# Patient Record
Sex: FEMALE | Race: WHITE | Employment: OTHER | URBAN - METROPOLITAN AREA
[De-identification: names, ages, dates, MRNs, and addresses within clinical notes are randomized per-mention and may not be internally consistent; named-entity substitution may affect disease eponyms.]

---

## 2017-04-20 PROCEDURE — 82570 ASSAY OF URINE CREATININE: CPT | Performed by: INTERNAL MEDICINE

## 2017-04-20 PROCEDURE — 81003 URINALYSIS AUTO W/O SCOPE: CPT | Performed by: INTERNAL MEDICINE

## 2017-04-20 PROCEDURE — 82043 UR ALBUMIN QUANTITATIVE: CPT | Performed by: INTERNAL MEDICINE

## 2017-05-15 PROBLEM — G45.9 TRANSIENT CEREBRAL ISCHEMIA, UNSPECIFIED TYPE: Status: ACTIVE | Noted: 2017-05-15

## 2017-07-06 PROCEDURE — 87086 URINE CULTURE/COLONY COUNT: CPT | Performed by: INTERNAL MEDICINE

## 2018-05-16 PROCEDURE — 87086 URINE CULTURE/COLONY COUNT: CPT | Performed by: INTERNAL MEDICINE

## 2018-05-16 PROCEDURE — 82570 ASSAY OF URINE CREATININE: CPT | Performed by: INTERNAL MEDICINE

## 2018-05-16 PROCEDURE — 87186 SC STD MICRODIL/AGAR DIL: CPT | Performed by: INTERNAL MEDICINE

## 2018-05-16 PROCEDURE — 87088 URINE BACTERIA CULTURE: CPT | Performed by: INTERNAL MEDICINE

## 2018-05-16 PROCEDURE — 82043 UR ALBUMIN QUANTITATIVE: CPT | Performed by: INTERNAL MEDICINE

## 2019-05-14 PROCEDURE — 86256 FLUORESCENT ANTIBODY TITER: CPT | Performed by: INTERNAL MEDICINE

## 2019-05-14 PROCEDURE — 86803 HEPATITIS C AB TEST: CPT | Performed by: INTERNAL MEDICINE

## 2019-05-14 PROCEDURE — 82784 ASSAY IGA/IGD/IGG/IGM EACH: CPT | Performed by: INTERNAL MEDICINE

## 2019-09-04 ENCOUNTER — OFFICE VISIT (OUTPATIENT)
Dept: HEMATOLOGY/ONCOLOGY | Facility: HOSPITAL | Age: 74
End: 2019-09-04
Attending: THORACIC SURGERY (CARDIOTHORACIC VASCULAR SURGERY)
Payer: COMMERCIAL

## 2019-09-04 VITALS
TEMPERATURE: 97 F | WEIGHT: 175 LBS | DIASTOLIC BLOOD PRESSURE: 79 MMHG | HEIGHT: 66 IN | HEART RATE: 79 BPM | BODY MASS INDEX: 28.13 KG/M2 | SYSTOLIC BLOOD PRESSURE: 132 MMHG | RESPIRATION RATE: 18 BRPM | OXYGEN SATURATION: 97 %

## 2019-09-04 DIAGNOSIS — R91.8 LUNG NODULES: Primary | ICD-10-CM

## 2019-09-04 PROCEDURE — 99211 OFF/OP EST MAY X REQ PHY/QHP: CPT

## 2019-09-04 NOTE — H&P (VIEW-ONLY)
Thoracic Surgery Consult    Reason for Consultation: Abnormal CT    Consulting Physician: Deepak Wesley    Chief Complaint: \"lung nodule got bigger\"    History of Present Illness: Patient is a 72year old, female with PMHx HTN, DM, L breast cancer s/p lumpectomy 8/31/2010   • Obesity    • Ovarian cyst    • Plantar fascial fibromatosis    • TIA (transient ischemic attack) 2016    right sided numbness   • Vitamin D deficiency 04/12/2010       Past Surgical History:    Past Surgical History:   Procedure Laterality Da Brother          Review of Systems:    A 10 point review of systems was conducted and was negative except that which was  listed in the above HPI as well as:  Pertinent negatives include:    - No unusual headaches, weakness or numbness  - No chest pain  - groundglass density within the peripheral aspect of the right upper lobe, the latter displaying mildly spiculated peripheral margins as well as a small, < 5 mm, solid nodular component, both displaying significant interval volume increase since 2/2/2018. on frozen section. Her pulmonary function tests suggest that she has adequate lung function to undergo the proposed surgery.   I went over the alternatives (surveillance, needle biopsy with possible radiation, chemotherapy) and the risks, including: bleedi

## 2019-09-04 NOTE — CONSULTS
Thoracic Surgery Consult    Reason for Consultation: Abnormal CT    Consulting Physician: Saintclair Raker    Chief Complaint: \"lung nodule got bigger\"    History of Present Illness: Patient is a 72year old, female with PMHx HTN, DM, L breast cancer s/p lumpectomy 8/31/2010   • Obesity    • Ovarian cyst    • Plantar fascial fibromatosis    • TIA (transient ischemic attack) 2016    right sided numbness   • Vitamin D deficiency 04/12/2010       Past Surgical History:    Past Surgical History:   Procedure Laterality Da Brother          Review of Systems:    A 10 point review of systems was conducted and was negative except that which was  listed in the above HPI as well as:  Pertinent negatives include:    - No unusual headaches, weakness or numbness  - No chest pain  - groundglass density within the peripheral aspect of the right upper lobe, the latter displaying mildly spiculated peripheral margins as well as a small, < 5 mm, solid nodular component, both displaying significant interval volume increase since 2/2/2018. on frozen section. Her pulmonary function tests suggest that she has adequate lung function to undergo the proposed surgery.   I went over the alternatives (surveillance, needle biopsy with possible radiation, chemotherapy) and the risks, including: bleedi

## 2019-10-02 ENCOUNTER — ANESTHESIA (OUTPATIENT)
Dept: CARDIAC SURGERY | Facility: HOSPITAL | Age: 74
End: 2019-10-02

## 2019-10-02 ENCOUNTER — ANESTHESIA EVENT (OUTPATIENT)
Dept: CARDIAC SURGERY | Facility: HOSPITAL | Age: 74
End: 2019-10-02

## 2019-10-02 ENCOUNTER — HOSPITAL ENCOUNTER (INPATIENT)
Facility: HOSPITAL | Age: 74
LOS: 3 days | Discharge: HOME | DRG: 164 | End: 2019-10-05
Attending: THORACIC SURGERY (CARDIOTHORACIC VASCULAR SURGERY) | Admitting: THORACIC SURGERY (CARDIOTHORACIC VASCULAR SURGERY)
Payer: COMMERCIAL

## 2019-10-02 PROCEDURE — 87116 MYCOBACTERIA CULTURE: CPT | Performed by: THORACIC SURGERY (CARDIOTHORACIC VASCULAR SURGERY)

## 2019-10-02 PROCEDURE — 88321 CONSLTJ&REPRT SLD PREP ELSWR: CPT | Performed by: THORACIC SURGERY (CARDIOTHORACIC VASCULAR SURGERY)

## 2019-10-02 PROCEDURE — 88305 TISSUE EXAM BY PATHOLOGIST: CPT | Performed by: THORACIC SURGERY (CARDIOTHORACIC VASCULAR SURGERY)

## 2019-10-02 PROCEDURE — 88341 IMHCHEM/IMCYTCHM EA ADD ANTB: CPT | Performed by: THORACIC SURGERY (CARDIOTHORACIC VASCULAR SURGERY)

## 2019-10-02 PROCEDURE — 88313 SPECIAL STAINS GROUP 2: CPT | Performed by: THORACIC SURGERY (CARDIOTHORACIC VASCULAR SURGERY)

## 2019-10-02 PROCEDURE — 82962 GLUCOSE BLOOD TEST: CPT

## 2019-10-02 PROCEDURE — 88331 PATH CONSLTJ SURG 1 BLK 1SPC: CPT | Performed by: THORACIC SURGERY (CARDIOTHORACIC VASCULAR SURGERY)

## 2019-10-02 PROCEDURE — 87102 FUNGUS ISOLATION CULTURE: CPT | Performed by: THORACIC SURGERY (CARDIOTHORACIC VASCULAR SURGERY)

## 2019-10-02 PROCEDURE — 3E0T3BZ INTRODUCTION OF ANESTHETIC AGENT INTO PERIPHERAL NERVES AND PLEXI, PERCUTANEOUS APPROACH: ICD-10-PCS | Performed by: THORACIC SURGERY (CARDIOTHORACIC VASCULAR SURGERY)

## 2019-10-02 PROCEDURE — 86901 BLOOD TYPING SEROLOGIC RH(D): CPT | Performed by: THORACIC SURGERY (CARDIOTHORACIC VASCULAR SURGERY)

## 2019-10-02 PROCEDURE — 87206 SMEAR FLUORESCENT/ACID STAI: CPT | Performed by: THORACIC SURGERY (CARDIOTHORACIC VASCULAR SURGERY)

## 2019-10-02 PROCEDURE — 88312 SPECIAL STAINS GROUP 1: CPT | Performed by: THORACIC SURGERY (CARDIOTHORACIC VASCULAR SURGERY)

## 2019-10-02 PROCEDURE — 93010 ELECTROCARDIOGRAM REPORT: CPT | Performed by: INTERNAL MEDICINE

## 2019-10-02 PROCEDURE — 07B74ZX EXCISION OF THORAX LYMPHATIC, PERCUTANEOUS ENDOSCOPIC APPROACH, DIAGNOSTIC: ICD-10-PCS | Performed by: THORACIC SURGERY (CARDIOTHORACIC VASCULAR SURGERY)

## 2019-10-02 PROCEDURE — 86850 RBC ANTIBODY SCREEN: CPT | Performed by: THORACIC SURGERY (CARDIOTHORACIC VASCULAR SURGERY)

## 2019-10-02 PROCEDURE — 0BTC4ZZ RESECTION OF RIGHT UPPER LUNG LOBE, PERCUTANEOUS ENDOSCOPIC APPROACH: ICD-10-PCS | Performed by: THORACIC SURGERY (CARDIOTHORACIC VASCULAR SURGERY)

## 2019-10-02 PROCEDURE — 0BBF4ZX EXCISION OF RIGHT LOWER LUNG LOBE, PERCUTANEOUS ENDOSCOPIC APPROACH, DIAGNOSTIC: ICD-10-PCS | Performed by: THORACIC SURGERY (CARDIOTHORACIC VASCULAR SURGERY)

## 2019-10-02 PROCEDURE — 88309 TISSUE EXAM BY PATHOLOGIST: CPT | Performed by: THORACIC SURGERY (CARDIOTHORACIC VASCULAR SURGERY)

## 2019-10-02 PROCEDURE — 88342 IMHCHEM/IMCYTCHM 1ST ANTB: CPT | Performed by: THORACIC SURGERY (CARDIOTHORACIC VASCULAR SURGERY)

## 2019-10-02 PROCEDURE — 93005 ELECTROCARDIOGRAM TRACING: CPT

## 2019-10-02 PROCEDURE — 0BJ08ZZ INSPECTION OF TRACHEOBRONCHIAL TREE, VIA NATURAL OR ARTIFICIAL OPENING ENDOSCOPIC: ICD-10-PCS | Performed by: THORACIC SURGERY (CARDIOTHORACIC VASCULAR SURGERY)

## 2019-10-02 PROCEDURE — 86900 BLOOD TYPING SEROLOGIC ABO: CPT | Performed by: THORACIC SURGERY (CARDIOTHORACIC VASCULAR SURGERY)

## 2019-10-02 PROCEDURE — 87081 CULTURE SCREEN ONLY: CPT | Performed by: HOSPITALIST

## 2019-10-02 PROCEDURE — 86920 COMPATIBILITY TEST SPIN: CPT

## 2019-10-02 PROCEDURE — 88332 PATH CONSLTJ SURG EA ADD BLK: CPT | Performed by: THORACIC SURGERY (CARDIOTHORACIC VASCULAR SURGERY)

## 2019-10-02 RX ORDER — MORPHINE SULFATE 4 MG/ML
6 INJECTION, SOLUTION INTRAMUSCULAR; INTRAVENOUS EVERY 4 HOURS PRN
Status: DISCONTINUED | OUTPATIENT
Start: 2019-10-02 | End: 2019-10-05

## 2019-10-02 RX ORDER — HYDROCODONE BITARTRATE AND ACETAMINOPHEN 5; 325 MG/1; MG/1
1 TABLET ORAL AS NEEDED
Status: DISCONTINUED | OUTPATIENT
Start: 2019-10-02 | End: 2019-10-02

## 2019-10-02 RX ORDER — SODIUM CHLORIDE, SODIUM LACTATE, POTASSIUM CHLORIDE, CALCIUM CHLORIDE 600; 310; 30; 20 MG/100ML; MG/100ML; MG/100ML; MG/100ML
INJECTION, SOLUTION INTRAVENOUS CONTINUOUS
Status: DISCONTINUED | OUTPATIENT
Start: 2019-10-02 | End: 2019-10-02 | Stop reason: HOSPADM

## 2019-10-02 RX ORDER — HYDROMORPHONE HYDROCHLORIDE 1 MG/ML
INJECTION, SOLUTION INTRAMUSCULAR; INTRAVENOUS; SUBCUTANEOUS
Status: COMPLETED
Start: 2019-10-02 | End: 2019-10-02

## 2019-10-02 RX ORDER — HYDROMORPHONE HYDROCHLORIDE 1 MG/ML
0.4 INJECTION, SOLUTION INTRAMUSCULAR; INTRAVENOUS; SUBCUTANEOUS EVERY 5 MIN PRN
Status: DISCONTINUED | OUTPATIENT
Start: 2019-10-02 | End: 2019-10-02 | Stop reason: HOSPADM

## 2019-10-02 RX ORDER — OXYCODONE HYDROCHLORIDE 5 MG/1
5 TABLET ORAL EVERY 4 HOURS PRN
Status: DISCONTINUED | OUTPATIENT
Start: 2019-10-02 | End: 2019-10-05

## 2019-10-02 RX ORDER — MORPHINE SULFATE 4 MG/ML
2 INJECTION, SOLUTION INTRAMUSCULAR; INTRAVENOUS EVERY 4 HOURS PRN
Status: DISCONTINUED | OUTPATIENT
Start: 2019-10-02 | End: 2019-10-05

## 2019-10-02 RX ORDER — POLYETHYLENE GLYCOL 3350 17 G/17G
17 POWDER, FOR SOLUTION ORAL DAILY PRN
Status: DISCONTINUED | OUTPATIENT
Start: 2019-10-02 | End: 2019-10-05

## 2019-10-02 RX ORDER — DEXTROSE MONOHYDRATE 25 G/50ML
50 INJECTION, SOLUTION INTRAVENOUS
Status: DISCONTINUED | OUTPATIENT
Start: 2019-10-02 | End: 2019-10-05

## 2019-10-02 RX ORDER — HEPARIN SODIUM 5000 [USP'U]/ML
5000 INJECTION, SOLUTION INTRAVENOUS; SUBCUTANEOUS ONCE
Status: DISCONTINUED | OUTPATIENT
Start: 2019-10-02 | End: 2019-10-02 | Stop reason: ALTCHOICE

## 2019-10-02 RX ORDER — NALOXONE HYDROCHLORIDE 0.4 MG/ML
80 INJECTION, SOLUTION INTRAMUSCULAR; INTRAVENOUS; SUBCUTANEOUS AS NEEDED
Status: DISCONTINUED | OUTPATIENT
Start: 2019-10-02 | End: 2019-10-02 | Stop reason: HOSPADM

## 2019-10-02 RX ORDER — ATORVASTATIN CALCIUM 10 MG/1
10 TABLET, FILM COATED ORAL NIGHTLY
Status: DISCONTINUED | OUTPATIENT
Start: 2019-10-02 | End: 2019-10-05

## 2019-10-02 RX ORDER — ASPIRIN 81 MG/1
81 TABLET ORAL DAILY
Status: DISCONTINUED | OUTPATIENT
Start: 2019-10-04 | End: 2019-10-05

## 2019-10-02 RX ORDER — SODIUM CHLORIDE 9 MG/ML
INJECTION, SOLUTION INTRAVENOUS CONTINUOUS
Status: DISCONTINUED | OUTPATIENT
Start: 2019-10-02 | End: 2019-10-05

## 2019-10-02 RX ORDER — ACETAMINOPHEN 10 MG/ML
1000 INJECTION, SOLUTION INTRAVENOUS EVERY 6 HOURS
Status: COMPLETED | OUTPATIENT
Start: 2019-10-02 | End: 2019-10-04

## 2019-10-02 RX ORDER — BISACODYL 10 MG
10 SUPPOSITORY, RECTAL RECTAL
Status: DISCONTINUED | OUTPATIENT
Start: 2019-10-02 | End: 2019-10-05

## 2019-10-02 RX ORDER — SODIUM CHLORIDE 9 MG/ML
INJECTION, SOLUTION INTRAVENOUS ONCE
Status: DISCONTINUED | OUTPATIENT
Start: 2019-10-02 | End: 2019-10-02

## 2019-10-02 RX ORDER — DEXTROSE MONOHYDRATE 25 G/50ML
50 INJECTION, SOLUTION INTRAVENOUS
Status: DISCONTINUED | OUTPATIENT
Start: 2019-10-02 | End: 2019-10-02 | Stop reason: HOSPADM

## 2019-10-02 RX ORDER — HYDROCODONE BITARTRATE AND ACETAMINOPHEN 5; 325 MG/1; MG/1
2 TABLET ORAL AS NEEDED
Status: DISCONTINUED | OUTPATIENT
Start: 2019-10-02 | End: 2019-10-02

## 2019-10-02 RX ORDER — MORPHINE SULFATE 4 MG/ML
4 INJECTION, SOLUTION INTRAMUSCULAR; INTRAVENOUS EVERY 4 HOURS PRN
Status: DISCONTINUED | OUTPATIENT
Start: 2019-10-02 | End: 2019-10-05

## 2019-10-02 RX ORDER — OXYCODONE HYDROCHLORIDE 5 MG/1
10 TABLET ORAL EVERY 4 HOURS PRN
Status: DISCONTINUED | OUTPATIENT
Start: 2019-10-02 | End: 2019-10-05

## 2019-10-02 RX ORDER — ONDANSETRON 2 MG/ML
4 INJECTION INTRAMUSCULAR; INTRAVENOUS AS NEEDED
Status: DISCONTINUED | OUTPATIENT
Start: 2019-10-02 | End: 2019-10-02 | Stop reason: HOSPADM

## 2019-10-02 RX ORDER — SODIUM CHLORIDE 0.9 % (FLUSH) 0.9 %
10 SYRINGE (ML) INJECTION AS NEEDED
Status: DISCONTINUED | OUTPATIENT
Start: 2019-10-02 | End: 2019-10-05

## 2019-10-02 RX ORDER — INSULIN ASPART 100 [IU]/ML
2 INJECTION, SOLUTION INTRAVENOUS; SUBCUTANEOUS ONCE
Status: COMPLETED | OUTPATIENT
Start: 2019-10-02 | End: 2019-10-02

## 2019-10-02 RX ORDER — INSULIN ASPART 100 [IU]/ML
INJECTION, SOLUTION INTRAVENOUS; SUBCUTANEOUS
Status: COMPLETED
Start: 2019-10-02 | End: 2019-10-02

## 2019-10-02 RX ORDER — LISINOPRIL 5 MG/1
5 TABLET ORAL DAILY
Status: DISCONTINUED | OUTPATIENT
Start: 2019-10-03 | End: 2019-10-05

## 2019-10-02 RX ORDER — CEFAZOLIN SODIUM/WATER 2 G/20 ML
2 SYRINGE (ML) INTRAVENOUS EVERY 8 HOURS
Status: COMPLETED | OUTPATIENT
Start: 2019-10-02 | End: 2019-10-02

## 2019-10-02 RX ORDER — ONDANSETRON 2 MG/ML
4 INJECTION INTRAMUSCULAR; INTRAVENOUS EVERY 6 HOURS PRN
Status: DISCONTINUED | OUTPATIENT
Start: 2019-10-02 | End: 2019-10-05

## 2019-10-02 RX ORDER — KETOROLAC TROMETHAMINE 30 MG/ML
15 INJECTION, SOLUTION INTRAMUSCULAR; INTRAVENOUS EVERY 6 HOURS
Status: DISCONTINUED | OUTPATIENT
Start: 2019-10-08 | End: 2019-10-02

## 2019-10-02 RX ORDER — DOCUSATE SODIUM 100 MG/1
100 CAPSULE, LIQUID FILLED ORAL 2 TIMES DAILY
Status: DISCONTINUED | OUTPATIENT
Start: 2019-10-02 | End: 2019-10-05

## 2019-10-02 RX ORDER — CEFAZOLIN SODIUM/WATER 2 G/20 ML
SYRINGE (ML) INTRAVENOUS
Status: DISCONTINUED | OUTPATIENT
Start: 2019-10-02 | End: 2019-10-02 | Stop reason: HOSPADM

## 2019-10-02 RX ORDER — BUPIVACAINE HYDROCHLORIDE 2.5 MG/ML
INJECTION, SOLUTION INFILTRATION; PERINEURAL AS NEEDED
Status: DISCONTINUED | OUTPATIENT
Start: 2019-10-02 | End: 2019-10-02 | Stop reason: HOSPADM

## 2019-10-02 RX ORDER — ENOXAPARIN SODIUM 100 MG/ML
40 INJECTION SUBCUTANEOUS DAILY
Status: DISCONTINUED | OUTPATIENT
Start: 2019-10-02 | End: 2019-10-05

## 2019-10-02 RX ORDER — MORPHINE SULFATE 2 MG/ML
2 INJECTION, SOLUTION INTRAMUSCULAR; INTRAVENOUS EVERY 5 MIN PRN
Status: DISCONTINUED | OUTPATIENT
Start: 2019-10-02 | End: 2019-10-02 | Stop reason: HOSPADM

## 2019-10-02 RX ORDER — CALCIUM CARBONATE 200(500)MG
1000 TABLET,CHEWABLE ORAL 3 TIMES DAILY PRN
Status: DISCONTINUED | OUTPATIENT
Start: 2019-10-02 | End: 2019-10-05

## 2019-10-02 RX ORDER — KETOROLAC TROMETHAMINE 30 MG/ML
15 INJECTION, SOLUTION INTRAMUSCULAR; INTRAVENOUS EVERY 6 HOURS
Status: DISCONTINUED | OUTPATIENT
Start: 2019-10-02 | End: 2019-10-05

## 2019-10-02 RX ORDER — SODIUM PHOSPHATE, DIBASIC AND SODIUM PHOSPHATE, MONOBASIC 7; 19 G/133ML; G/133ML
1 ENEMA RECTAL ONCE AS NEEDED
Status: DISCONTINUED | OUTPATIENT
Start: 2019-10-02 | End: 2019-10-05

## 2019-10-02 RX ORDER — MEPERIDINE HYDROCHLORIDE 25 MG/ML
12.5 INJECTION INTRAMUSCULAR; INTRAVENOUS; SUBCUTANEOUS AS NEEDED
Status: DISCONTINUED | OUTPATIENT
Start: 2019-10-02 | End: 2019-10-02 | Stop reason: HOSPADM

## 2019-10-02 NOTE — CONSULTS
[facility] Hospitalist History and Physical      CC:  Medical management     PCP: Chelly Shore MD      History of Present Illness: Patient is a 72year old female with PMH sig for DM2, HTN, obesity, who presents sp wedge resections w RUL lobectomy.   Prelim pat Right 5/3/2017    Performed by Sylvie Lynn MD at [facility]        ALL:    Gluten Meal             NAUSEA AND VOMITING  Gluten Flour                   No current outpatient medications on file.     Social History    Tobacco Use      Smokin atraumatic, no cyanosis or edema. Skin: Skin color, texture, turgor normal. No rashes or lesions.     Neurologic: Normal strength, no focal deficit appreciated     Data Review:    LABS:   Lab Results   Component Value Date    PGLU 207 10/02/2019       CXR salivary glands. No hypermetabolic cervical lymph node is identified. . CHEST: There are groundglass pulmonary nodules predominantly within the right upper lobe are similar in appearance to recent chest CT.  The dominant ground glass nodule demonstrates mild coordinating care. Greater than 50% face to face encounter.       Em Flores MD  [facility] Hospitalist  [phone]

## 2019-10-02 NOTE — PROGRESS NOTES
10/02/19 1658   Clinical Encounter Type   Visited With Patient not available  (Patient sleeping)   Continue Visiting Yes  (Patient not available)   Responded to request for consult - Advance Directives.   remains available for support as needed o

## 2019-10-02 NOTE — ANESTHESIA PREPROCEDURE EVALUATION
PRE-OP EVALUATION    Patient Name: Kumar Hayes    Pre-op Diagnosis: lung nodules    Procedure(s):  flexible bronchoscopy, right video assisted wedge resection, possible lobectomy, lymph node dissection  Allison     Surgeon(s) and Role:     Sade Sosa removed due to infection   • CHOLECYSTECTOMY      1977 age 29   • COLONOSCOPY  12/31/03= Diverticulosis, Hemorrhoids    Repeat 2024   • COLONOSCOPY, POSSIBLE BIOPSY, POSSIBLE POLYPECTOMY 39513 N/A 4/23/2014    Performed by Julián Garcia MD at [facility] cm  Neck ROM: full Cardiovascular    Cardiovascular exam normal.         Dental             Pulmonary    Pulmonary exam normal.                 Other findings            ASA: 3   Plan: general  NPO status verified and patient meets guidelines.         Comme

## 2019-10-02 NOTE — CONSULTS
Pulmonary / Critical Care H&P/Consult       NAME: Marybeth Herrmann - ROOM: [facility]0645-E - MRN: [id number] - Age: 72year old - :  12/3/1945    Date of Admission: 10/2/2019  5:44 AM  Admission Diagnosis: lung nodules    Reason for consult: icu management reconstruction to \"match\" lt breast after lumpectomy and implant   • RADIATION LEFT  2007   • RIGHT PHACOEMULSIFICATION OF CATARACT WITH INTRAOCULAR LENS IMPLANT WITH ORA Right 5/3/2017    Performed by Helio Moreira MD at [address] [place] [address] Forced sexual activity: Not on file    Other Topics      Concerns:        Not on file    Social History Narrative      : 1967      Children: 2      Exercise: treadmill,hikes      Employment: retired teacher      Caffeine intake: 2 coffee/d mg Intravenous Q6H   • acetaminophen  1,000 mg Intravenous Q6H     Continuous Infusing Medication:  • sodium chloride 60 mL/hr at 10/02/19 1300     PRN Medication:Normal Saline Flush, morphINE sulfate **OR** morphINE sulfate **OR** morphINE sulfate, PEG 33 non-tender, bowel sounds active all four quadrants,     no masses, no organomegaly   Extremities:   Extremities normal, atraumatic, no cyanosis or edema   Pulses:   2+ and symmetric all extremities   Skin:   Skin color, texture, turgor normal, no rashes or

## 2019-10-02 NOTE — PLAN OF CARE
Received patient from PACU around 1215. S/p Right VATS & Lymph Node Dissection. Right chest tube to -20 suction with adequate serosanguinous output. Chest tube dressing CDI. AOx4, neurologically intact. Room air, lungs clear, saturating 96%.  Achieving 750- Progressing

## 2019-10-02 NOTE — OPERATIVE REPORT
[address]    PATIENT'S NAME: Bety Sy   ATTENDING PHYSICIAN: [address] Lauren Space, MD   OPERATING PHYSICIAN: Imtiaz Ragland.  Lauren Antonio MD   PATIENT ACCOUNT#:   [id number]    LOCATION:  PACU [facility] PACU 6 EDWP [address] #:   [id number]       D were seen. She was then placed in the left lateral decubitus position. All pressure points were padded. She was prepped and draped in a sterile fashion. A time-out was performed to confirm patient, side, and site of surgery.       I made a 2 cm incision around the superior pulmonary vein. I was able to clearly identify a vein to the middle lobe and worked to spare this. I did circumferential dissection around the vein to the upper lobe.   I passed a right-angle clamp to create a tunnel and transected it stump air leak. There was none. I then placed a 28-Upper sorbian chest tube posteriorly and secured it using 0 silk sutures. I then asked Anesthesia to reinflate the lung. It reinflated well, and the middle lobe inflated without any torsion.   I then removed m

## 2019-10-03 ENCOUNTER — APPOINTMENT (OUTPATIENT)
Dept: GENERAL RADIOLOGY | Facility: HOSPITAL | Age: 74
DRG: 164 | End: 2019-10-03
Attending: INTERNAL MEDICINE
Payer: COMMERCIAL

## 2019-10-03 PROCEDURE — 80048 BASIC METABOLIC PNL TOTAL CA: CPT | Performed by: INTERNAL MEDICINE

## 2019-10-03 PROCEDURE — 82962 GLUCOSE BLOOD TEST: CPT

## 2019-10-03 PROCEDURE — 85025 COMPLETE CBC W/AUTO DIFF WBC: CPT | Performed by: INTERNAL MEDICINE

## 2019-10-03 PROCEDURE — 71045 X-RAY EXAM CHEST 1 VIEW: CPT | Performed by: INTERNAL MEDICINE

## 2019-10-03 PROCEDURE — 83735 ASSAY OF MAGNESIUM: CPT | Performed by: INTERNAL MEDICINE

## 2019-10-03 RX ORDER — MAGNESIUM OXIDE 400 MG (241.3 MG MAGNESIUM) TABLET
400 TABLET ONCE
Status: COMPLETED | OUTPATIENT
Start: 2019-10-03 | End: 2019-10-03

## 2019-10-03 RX ORDER — POTASSIUM CHLORIDE 20 MEQ/1
40 TABLET, EXTENDED RELEASE ORAL ONCE
Status: COMPLETED | OUTPATIENT
Start: 2019-10-03 | End: 2019-10-03

## 2019-10-03 NOTE — PLAN OF CARE
Assumed care of pt approx 1930, s/p wedge lobectomy. Rt lateral CT to -20 cmH20 draining small amount serosang drainage, intermittent air leak with deep inspiration.   Medicated with morphine once for mild to moderate pain with relief, pain has been well c

## 2019-10-03 NOTE — PROGRESS NOTES
Thoracic Surgery Progress Note     Cesario Babinski is a 72year old female. MRN [id number]. Admitted 10/2/2019    SUBJECTIVE/24H EVENTS:     No acute events overnight. Mild pain at incisions/tube, controlled with current pain regimen.   Louie removed Mariia Mealing Assessment/Plan:     72year old female s/p R VATs upper lobectomy POD 1    Ambulate 3-4 times per day in the halls  Spend majority of day out of bed, in chair  Encouraged cough and IS use  Chest tube to water seal  Transfer to Asha Arshad 673 PA-

## 2019-10-03 NOTE — PROGRESS NOTES
[facility] Hospitalist Progress Note     Frankey Shackleton Patient Status:  Inpatient    12/3/1945 MRN [id number]   [facility] 8NE-A Attending Ginger Farmer., Geraldine Galarza MD   Hosp Day # 1 PCP Lorelei Jensen MD     CC: follow up    SUBJECTIVE:  No CP sulfate, PEG 3350, magnesium hydroxide, bisacodyl, FLEET ENEMA, ondansetron HCl, Calcium Carbonate Antacid, glucose **OR** Glucose-Vitamin C **OR** dextrose **OR** glucose **OR** Glucose-Vitamin C, oxyCODONE HCl, oxyCODONE HCl, influenza virus vaccine PF

## 2019-10-03 NOTE — PROGRESS NOTES
Doris Lopez 794 Patient Status:  Inpatient    12/3/1945 MRN [id number]   [facility] 6NE-A Attending Leonel Blunt., Mario Montero MD   Hosp Day # 1 PCP Pierre Gao MD     Pulm / Critical Care Progress Note     S: pt is feel Normocephalic, without obvious abnormality, atraumatic. Lungs: ctab   Chest wall: ct on R; mild intermittent air leak with deep inspiration / cough   Heart: Regular rate and rhythm, normal S1S2, no murmur.    Abdomen: soft, non-tender, non-distended, posi

## 2019-10-03 NOTE — BRIEF OP NOTE
Pre-Operative Diagnosis: lung nodules     Post-Operative Diagnosis: same      Procedure Performed:   Procedure(s):  flexible bronchoscopy, right video assisted wedge resection, right upper lobectomy, lymph node dissection      Surgeon(s) and Role:     * Isamar Cuellar

## 2019-10-03 NOTE — PLAN OF CARE
Assumed care 0715. Up in chair. Pain well controlled, GARCIA, following commands. Monitor shows NSR. Lung sounds clear/diminished. Chest tube dressing CDI, draining appropriately. To water seal. Up walking the unit with little difficulty.  See flow sheet for d and instruct to report SOB or any respiratory difficulty  - Respiratory Therapy support as indicated  - Manage/alleviate anxiety  - Monitor for signs/symptoms of CO2 retention  Outcome: Progressing     Problem: SKIN/TISSUE INTEGRITY - ADULT  Goal: Incision

## 2019-10-04 PROCEDURE — 83735 ASSAY OF MAGNESIUM: CPT | Performed by: INTERNAL MEDICINE

## 2019-10-04 PROCEDURE — 84132 ASSAY OF SERUM POTASSIUM: CPT | Performed by: INTERNAL MEDICINE

## 2019-10-04 PROCEDURE — 82962 GLUCOSE BLOOD TEST: CPT

## 2019-10-04 RX ORDER — ACETAMINOPHEN 325 MG/1
650 TABLET ORAL EVERY 4 HOURS
Status: DISCONTINUED | OUTPATIENT
Start: 2019-10-04 | End: 2019-10-05

## 2019-10-04 RX ORDER — OXYCODONE HYDROCHLORIDE 5 MG/1
5 TABLET ORAL EVERY 4 HOURS PRN
Qty: 30 TABLET | Refills: 0 | Status: SHIPPED | OUTPATIENT
Start: 2019-10-04 | End: 2019-10-05

## 2019-10-04 NOTE — PROGRESS NOTES
Thoracic Surgery Progress Note     Claire Avitia is a 72year old female. MRN [id number]. Admitted 10/2/2019    SUBJECTIVE/24H EVENTS:     No acute events overnight. Mild pain at incisions/tube, controlled with current pain regimen.   Has been walking [phone]

## 2019-10-04 NOTE — PROGRESS NOTES
10/04/19 1810   Clinical Encounter Type   Visited With Patient and family together  ( at bedside. )   Routine Visit Introduction   Referral From Other (Comment)  (Consult)   Referral To    Patient Spiritual Encounters   Adaptation to [facility]

## 2019-10-04 NOTE — PLAN OF CARE
Assumed care of pt @ 2300. POD 2 s/p RUL wedge lobectomy and lymph node dissection. AOx4. O2 sats maintained on RA. CT to water seal, int air leak noted during deep inspiration. NSR on tele.  Pain managed with ATC IV ofirmev and toradol on alternating sched respiratory difficulty  - Respiratory Therapy support as indicated  - Manage/alleviate anxiety  - Monitor for signs/symptoms of CO2 retention  Outcome: Progressing     Problem: SKIN/TISSUE INTEGRITY - ADULT  Goal: Incision(s), wounds(s) or drain site(s) he

## 2019-10-04 NOTE — PROGRESS NOTES
[facility] Hospitalist Progress Note     Dina Faulkner Patient Status:  Inpatient    12/3/1945 MRN [id number]   [facility] 8NE-A Attending Quoc Singer., Antoni Keenan MD   Hosp Day # 2 PCP Pk Vasquez MD     CC: follow up    SUBJECTIVE:  Pain Microbiology:    No results found for this visit on 10/02/19. Assessment/Plan:     Lung nodules sp wedge resection/RUL lobectomy  - CT per CV   - cont pain control     HTN  - cont enalapril     DM2  - Hold oral hypoglycemics.     - Start SSI wit

## 2019-10-04 NOTE — PLAN OF CARE
Ambulated in the halls 3x,tolerated well  No sob, on                  Problem: Diabetes/Glucose Control  Goal: Glucose maintained within prescribed range  Description  INTERVENTIONS:  - Monitor Blood Glucose as ordered  - Assess for signs and symptoms of h

## 2019-10-04 NOTE — PLAN OF CARE
Assumed care of patient at 1500. Patient is alert and oriented x4. Patient asking about plan of care. Patient updated on plan of care. Denies questions or concerns at this time. Patient verbalized understanding of education.  Plan is to wait for Dr. Sophia Ellis

## 2019-10-04 NOTE — PROGRESS NOTES
Doris Lopez 794 Patient Status:  Inpatient    12/3/1945 MRN [id number]   [facility] 8NE-A Attending Candice Roman., Autumn Howard MD   Hosp Day # 2 PCP Naomy Patiño MD     Pulm / Critical Care Progress Note     S: pt feeling R. Mild intermittent air leak with cough   Heart: Regular rate and rhythm, normal S1S2, no murmur. Abdomen: soft, non-tender, non-distended, positive BS.    Extremity: no edema         Recent Labs   Lab 10/03/19  0443   WBC 12.6*   HGB 9.2*   HCT 28.8*

## 2019-10-05 VITALS
DIASTOLIC BLOOD PRESSURE: 48 MMHG | HEIGHT: 66 IN | OXYGEN SATURATION: 95 % | TEMPERATURE: 99 F | SYSTOLIC BLOOD PRESSURE: 115 MMHG | HEART RATE: 77 BPM | BODY MASS INDEX: 28.23 KG/M2 | RESPIRATION RATE: 18 BRPM | WEIGHT: 175.69 LBS

## 2019-10-05 PROCEDURE — 82962 GLUCOSE BLOOD TEST: CPT

## 2019-10-05 RX ORDER — SENNA AND DOCUSATE SODIUM 50; 8.6 MG/1; MG/1
1 TABLET, FILM COATED ORAL 2 TIMES DAILY PRN
Qty: 30 TABLET | Refills: 0 | Status: SHIPPED | OUTPATIENT
Start: 2019-10-05 | End: 2020-01-22 | Stop reason: ALTCHOICE

## 2019-10-05 RX ORDER — LISINOPRIL 5 MG/1
5 TABLET ORAL DAILY
COMMUNITY
End: 2020-11-10

## 2019-10-05 RX ORDER — ACETAMINOPHEN 500 MG
TABLET ORAL EVERY 6 HOURS PRN
Qty: 40 TABLET | Refills: 0 | Status: SHIPPED | OUTPATIENT
Start: 2019-10-05 | End: 2020-01-22 | Stop reason: ALTCHOICE

## 2019-10-05 NOTE — PLAN OF CARE
Patient is alert and oriented x4. Patient asking about plan of care. Patient updated on plan of care. Denies questions or concerns at this time. Patient verbalized understanding of education. Plan is to discharge home today.  Patient tolerated chest tube re needed  - Assess and instruct to report SOB or any respiratory difficulty  - Respiratory Therapy support as indicated  - Manage/alleviate anxiety  - Monitor for signs/symptoms of CO2 retention  Outcome: Progressing     Problem: SKIN/TISSUE INTEGRITY - ADUL

## 2019-10-05 NOTE — PROGRESS NOTES
[facility] Hospitalist Progress Note     Kenzieerasto Hernandez Patient Status:  Inpatient    12/3/1945 MRN [id number]   [facility] 8NE-A Attending Jay Wan., Jacinda Hoffman MD   Hosp Day # 3 PCP Livier Peña MD     CC: follow up    SUBJECTIVE:  In go

## 2019-10-05 NOTE — PROGRESS NOTES
THORACIC SURGERY POST-OPERATIVE PROGRESS NOTE    Subjective:Doing well. Ambulating    Objective:No notable event overnight. Remains well.   Ambulating     10/05/19  0813   BP: 105/51   Pulse: 76   Resp: 18   Temp: 98.8 °F (37.1 °C)       I/O last 3 complet

## 2019-10-05 NOTE — PLAN OF CARE
Received patient, alert and oriented. Claimed adequate pain relief. Chest tube to water seal, dressing dry and intact. Discussed POC. Due meds given. Safety measures reinforced, call light within reach. Needs attended to. Will continue to monitor.     Prob signs/symptoms of CO2 retention  Outcome: Progressing     Problem: SKIN/TISSUE INTEGRITY - ADULT  Goal: Incision(s), wounds(s) or drain site(s) healing without S/S of infection  Description  INTERVENTIONS:  - Assess and document risk factors for pressure u

## 2019-10-06 ENCOUNTER — APPOINTMENT (OUTPATIENT)
Dept: GENERAL RADIOLOGY | Facility: HOSPITAL | Age: 74
DRG: 384 | End: 2019-10-06
Attending: EMERGENCY MEDICINE
Payer: COMMERCIAL

## 2019-10-06 ENCOUNTER — APPOINTMENT (OUTPATIENT)
Dept: CT IMAGING | Facility: HOSPITAL | Age: 74
DRG: 384 | End: 2019-10-06
Attending: EMERGENCY MEDICINE
Payer: COMMERCIAL

## 2019-10-06 ENCOUNTER — HOSPITAL ENCOUNTER (INPATIENT)
Facility: HOSPITAL | Age: 74
LOS: 2 days | Discharge: HOME | DRG: 384 | End: 2019-10-08
Attending: EMERGENCY MEDICINE | Admitting: INTERNAL MEDICINE
Payer: COMMERCIAL

## 2019-10-06 DIAGNOSIS — K29.00 ACUTE GASTRITIS WITHOUT HEMORRHAGE, UNSPECIFIED GASTRITIS TYPE: Primary | ICD-10-CM

## 2019-10-06 PROBLEM — E87.1 HYPONATREMIA: Status: ACTIVE | Noted: 2019-10-06

## 2019-10-06 PROBLEM — R11.2 NAUSEA & VOMITING: Status: ACTIVE | Noted: 2019-10-06

## 2019-10-06 PROCEDURE — 93010 ELECTROCARDIOGRAM REPORT: CPT

## 2019-10-06 PROCEDURE — 83605 ASSAY OF LACTIC ACID: CPT | Performed by: EMERGENCY MEDICINE

## 2019-10-06 PROCEDURE — 99285 EMERGENCY DEPT VISIT HI MDM: CPT

## 2019-10-06 PROCEDURE — 84484 ASSAY OF TROPONIN QUANT: CPT | Performed by: EMERGENCY MEDICINE

## 2019-10-06 PROCEDURE — C9113 INJ PANTOPRAZOLE SODIUM, VIA: HCPCS | Performed by: EMERGENCY MEDICINE

## 2019-10-06 PROCEDURE — 85025 COMPLETE CBC W/AUTO DIFF WBC: CPT | Performed by: EMERGENCY MEDICINE

## 2019-10-06 PROCEDURE — 71045 X-RAY EXAM CHEST 1 VIEW: CPT | Performed by: EMERGENCY MEDICINE

## 2019-10-06 PROCEDURE — 83690 ASSAY OF LIPASE: CPT | Performed by: EMERGENCY MEDICINE

## 2019-10-06 PROCEDURE — 96361 HYDRATE IV INFUSION ADD-ON: CPT

## 2019-10-06 PROCEDURE — 93005 ELECTROCARDIOGRAM TRACING: CPT

## 2019-10-06 PROCEDURE — 96375 TX/PRO/DX INJ NEW DRUG ADDON: CPT

## 2019-10-06 PROCEDURE — 71250 CT THORAX DX C-: CPT | Performed by: EMERGENCY MEDICINE

## 2019-10-06 PROCEDURE — 74177 CT ABD & PELVIS W/CONTRAST: CPT | Performed by: EMERGENCY MEDICINE

## 2019-10-06 PROCEDURE — 80053 COMPREHEN METABOLIC PANEL: CPT | Performed by: EMERGENCY MEDICINE

## 2019-10-06 PROCEDURE — 96365 THER/PROPH/DIAG IV INF INIT: CPT

## 2019-10-06 RX ORDER — ONDANSETRON 2 MG/ML
4 INJECTION INTRAMUSCULAR; INTRAVENOUS EVERY 6 HOURS PRN
Status: DISCONTINUED | OUTPATIENT
Start: 2019-10-06 | End: 2019-10-08

## 2019-10-06 RX ORDER — ACETAMINOPHEN 325 MG/1
650 TABLET ORAL EVERY 6 HOURS PRN
Status: DISCONTINUED | OUTPATIENT
Start: 2019-10-06 | End: 2019-10-08

## 2019-10-06 RX ORDER — SODIUM CHLORIDE 9 MG/ML
INJECTION, SOLUTION INTRAVENOUS CONTINUOUS
Status: CANCELLED | OUTPATIENT
Start: 2019-10-06 | End: 2019-10-06

## 2019-10-06 RX ORDER — SODIUM CHLORIDE 9 MG/ML
INJECTION, SOLUTION INTRAVENOUS CONTINUOUS
Status: DISCONTINUED | OUTPATIENT
Start: 2019-10-06 | End: 2019-10-07

## 2019-10-06 RX ORDER — ONDANSETRON 2 MG/ML
4 INJECTION INTRAMUSCULAR; INTRAVENOUS ONCE
Status: COMPLETED | OUTPATIENT
Start: 2019-10-06 | End: 2019-10-06

## 2019-10-06 RX ORDER — SODIUM CHLORIDE 9 MG/ML
INJECTION, SOLUTION INTRAVENOUS CONTINUOUS
Status: DISCONTINUED | OUTPATIENT
Start: 2019-10-06 | End: 2019-10-08

## 2019-10-06 RX ORDER — METOCLOPRAMIDE HYDROCHLORIDE 5 MG/ML
10 INJECTION INTRAMUSCULAR; INTRAVENOUS EVERY 8 HOURS PRN
Status: DISCONTINUED | OUTPATIENT
Start: 2019-10-06 | End: 2019-10-08

## 2019-10-06 NOTE — ED INITIAL ASSESSMENT (HPI)
Patient with c/o nausea and vomiting for four days. Patient recently discharged four days ago after right upper lobectomy. Patient c/o RUQ and pain with vomiting. Low grade fevers of 99 at home per patient. Nonproductive cough, denies urinary symptoms.

## 2019-10-06 NOTE — ED PROVIDER NOTES
Patient Seen in: [facility] Emergency Department      History   Patient presents with:  Nausea/Vomiting/Diarrhea (gastrointestinal)  Abdomen/Flank Pain (GI/)    Stated Complaint: Discharged after 4 day admission on Saturday, returning for continue [address] [place] [address]   • LEFT PHACOEMULSIFICATION OF CATARACT WITH INTRAOCULAR LENS IMPLANT WITH ORA Left 6/14/2017    Performed by Bert Huntley MD at [address] [place] [address]   • LUMPECTOMY LEFT      left 2007   • OTHER  2007    rt breast re extremities are normal neurologic exam normal there is no rebound tenderness    ED Course     Labs Reviewed   COMP METABOLIC PANEL (14) - Abnormal; Notable for the following components:       Result Value    Glucose 155 (*)     Sodium 134 (*)     Total Pro axilla and along the shoulder girdle and distally along the ribcage. This has developed since the prior postoperative film of 10/3/2019. The left chest is normal.  There is no pneumomediastinum.   Imaging findings discussed with Dr. Juan Glover on 10/6/2019 at There is a suspected right periapical pneumothorax with volume loss of the medial right upper lung and tracheal shift to the right with right chest wall emphysema. See above. Left chest is unremarkable.     Dictated by: Kenji Cartagena MD on 10/06/2019 at

## 2019-10-07 ENCOUNTER — ANESTHESIA EVENT (OUTPATIENT)
Dept: ENDOSCOPY | Facility: HOSPITAL | Age: 74
End: 2019-10-07

## 2019-10-07 ENCOUNTER — ANESTHESIA (OUTPATIENT)
Dept: ENDOSCOPY | Facility: HOSPITAL | Age: 74
End: 2019-10-07

## 2019-10-07 ENCOUNTER — APPOINTMENT (OUTPATIENT)
Dept: GENERAL RADIOLOGY | Facility: HOSPITAL | Age: 74
DRG: 384 | End: 2019-10-07
Attending: THORACIC SURGERY (CARDIOTHORACIC VASCULAR SURGERY)
Payer: COMMERCIAL

## 2019-10-07 PROCEDURE — 85025 COMPLETE CBC W/AUTO DIFF WBC: CPT | Performed by: INTERNAL MEDICINE

## 2019-10-07 PROCEDURE — 82962 GLUCOSE BLOOD TEST: CPT

## 2019-10-07 PROCEDURE — 0DB78ZX EXCISION OF STOMACH, PYLORUS, VIA NATURAL OR ARTIFICIAL OPENING ENDOSCOPIC, DIAGNOSTIC: ICD-10-PCS | Performed by: INTERNAL MEDICINE

## 2019-10-07 PROCEDURE — 80053 COMPREHEN METABOLIC PANEL: CPT | Performed by: INTERNAL MEDICINE

## 2019-10-07 PROCEDURE — 85018 HEMOGLOBIN: CPT | Performed by: HOSPITALIST

## 2019-10-07 PROCEDURE — 0DB98ZX EXCISION OF DUODENUM, VIA NATURAL OR ARTIFICIAL OPENING ENDOSCOPIC, DIAGNOSTIC: ICD-10-PCS | Performed by: INTERNAL MEDICINE

## 2019-10-07 PROCEDURE — 88305 TISSUE EXAM BY PATHOLOGIST: CPT | Performed by: INTERNAL MEDICINE

## 2019-10-07 PROCEDURE — 0DB68ZX EXCISION OF STOMACH, VIA NATURAL OR ARTIFICIAL OPENING ENDOSCOPIC, DIAGNOSTIC: ICD-10-PCS | Performed by: INTERNAL MEDICINE

## 2019-10-07 PROCEDURE — 71045 X-RAY EXAM CHEST 1 VIEW: CPT | Performed by: THORACIC SURGERY (CARDIOTHORACIC VASCULAR SURGERY)

## 2019-10-07 PROCEDURE — 83036 HEMOGLOBIN GLYCOSYLATED A1C: CPT | Performed by: HOSPITALIST

## 2019-10-07 RX ORDER — LISINOPRIL 5 MG/1
5 TABLET ORAL DAILY
Status: DISCONTINUED | OUTPATIENT
Start: 2019-10-07 | End: 2019-10-08

## 2019-10-07 RX ORDER — ATORVASTATIN CALCIUM 10 MG/1
10 TABLET, FILM COATED ORAL
Status: DISCONTINUED | OUTPATIENT
Start: 2019-10-07 | End: 2019-10-08

## 2019-10-07 RX ORDER — PANTOPRAZOLE SODIUM 40 MG/1
40 TABLET, DELAYED RELEASE ORAL
Status: DISCONTINUED | OUTPATIENT
Start: 2019-10-07 | End: 2019-10-08

## 2019-10-07 RX ORDER — SENNA AND DOCUSATE SODIUM 50; 8.6 MG/1; MG/1
1 TABLET, FILM COATED ORAL 2 TIMES DAILY PRN
Status: DISCONTINUED | OUTPATIENT
Start: 2019-10-07 | End: 2019-10-08

## 2019-10-07 RX ORDER — DEXTROSE MONOHYDRATE 25 G/50ML
50 INJECTION, SOLUTION INTRAVENOUS
Status: DISCONTINUED | OUTPATIENT
Start: 2019-10-07 | End: 2019-10-08

## 2019-10-07 NOTE — PROGRESS NOTES
Thoracic Surgery Progress Note     Aron Tidwell is a 72year old female. MRN [id number]. Admitted 10/6/2019    [address] [place] [address] EVENTS:     Feeling better today. No SOB, no chest pain. Minimal soreness at incisions.       Objective:     VITALS:     Tem [phone]      Addendum 1312: Discussed with Dr. Edmundo Cleaning. CXR stable. No concern for ongoing air leak. If staying overnight can have repeat CXR in AM. No further concerns from thoracic standpoint, ok to DC from thoracic POV.  Remainder of management and

## 2019-10-07 NOTE — ANESTHESIA PREPROCEDURE EVALUATION
PRE-OP EVALUATION    Patient Name: Lala Hayes    Pre-op Diagnosis: Abdominal pain, gastric ulcer    Procedure(s):  ESOPHAGOGASTRODUODENOSCOPY    Surgeon(s) and Role:     * Mono Carter MD - Primary    Pre-op vitals reviewed.   Temp: 99.5 °F (37 30 tablet Rfl: 0   acetaminophen 500 MG Oral Tab Take 1-2 tablets (500-1,000 mg total) by mouth every 6 (six) hours as needed for Pain. Disp: 40 tablet Rfl: 0   lisinopril 5 MG Oral Tab Take 5 mg by mouth daily.  Disp:  Rfl:    aspirin 81 MG Oral Tab Take 8 BIOPSY, POSSIBLE POLYPECTOMY 63606 N/A 4/23/2014    Performed by Lucy Sanders MD at [facility]   • HX BREAST CANCER  10/2007   • HYSTERECTOMY      BERTRAM BSO, 2002 age 66   • IMPLANT LEFT  2007   • INTRAOCULAR LENS EXCHANGE Right 5/24/2017 10/07/2019     09/10/2019    CO2 26.0 10/07/2019    CO2 30.0 09/10/2019    BUN 10 10/07/2019    BUN 14.0 09/10/2019    CREATSERUM 0.55 10/07/2019    CREATSERUM 0.80 09/10/2019     (H) 10/07/2019    GLU 82 09/10/2019    CA 8.2 (L) 10/07/2019

## 2019-10-07 NOTE — OPERATIVE REPORT
OPERATIVE REPORT   PATIENT NAME: Amos Whittaker  MRN: [id number]  DATE OF OPERATION: 10/7/2019  PREOPERATIVE DIAGNOSIS: epigastric abdominal pain, abnormal CT scan; ASA use; occasional Aleve use  POSTOPERATIVE DIAGNOSIS:    1.   Huge deep, excavating clean summary detailing the procedure, findings, followup plans, and an updated medication list.     Thank you very much for the consultation. I really appreciate it.     Zackery Albert MD

## 2019-10-07 NOTE — PLAN OF CARE
NURSING ADMISSION NOTE      Patient admitted via cart  Oriented to room. Safety precautions initiated. Bed in low position. Call light in reach. Alert and oriented x4. On 2L of O2 with SpO2 at 96%, lungs clear  NSR per tele.  Pt denies chest pain, safest level of function  Description  INTERVENTIONS:  - Assess patient stability and activity tolerance for standing, transferring and ambulating w/ or w/o assistive devices  - Assist with transfers and ambulation using safe patient handling equipment as

## 2019-10-07 NOTE — PLAN OF CARE
Assumed care for this patient at 0730: patient alert and oriented. Admitted with abdominal pain, nausea and vomiting. S/p right upper lobectomy on 10/3, dc home on 10/5. Patient still slightly nauseated this am, declined zofran. NPO. IV fluids infusing.  GI patient stability and activity tolerance for standing, transferring and ambulating w/ or w/o assistive devices  - Assist with transfers and ambulation using safe patient handling equipment as needed  - Ensure adequate protection for wounds/incisions during

## 2019-10-07 NOTE — ANESTHESIA POSTPROCEDURE EVALUATION
Dorismikal Lopez 794 Patient Status:  Inpatient   Age/Gender 72year old female MRN [id number]   Location [facility]. Attending Man Vincent, [address] [facility] Day # 1 PCP Rodney Hewitt MD       Anesthesia Post-op Note    Procedure(s

## 2019-10-07 NOTE — H&P
History & Physical Examination    Patient Name: Miguel Park  MRN: [id number]  [facility]: [id number]  YOB: 1945    Diagnosis: Acute gastritis without hemorrhage, unspecified gastritis type [K29.00]      Present Illness:  Miguel Park is a 72 Intravenous Continuous   ondansetron HCl (ZOFRAN) injection 4 mg 4 mg Intravenous Q6H PRN   Metoclopramide HCl (REGLAN) injection 10 mg 10 mg Intravenous Q8H PRN       Allergies:   Gluten Meal             NAUSEA AND VOMITING  Gluten Flour                Pa 28        Quit date: 2002        Years since quittin.7      Smokeless tobacco: Never Used    Alcohol use: Yes      Frequency: 2-4 times a month      Drinks per session: 1 or 2      Comment: rare      SYSTEM Check if Review is Normal Check if Physi

## 2019-10-07 NOTE — H&P
[facility] Hospitalist H&P       CC: Patient presents with:  Nausea/Vomiting/Diarrhea (gastrointestinal)  Abdomen/Flank Pain (GI/)       PCP: Alejandra Haro MD    History of Present Illness: Patient is a 72year old female with PMH sig for breast ca, HTN, HL INTRAOCULAR LENS IMPLANT WITH ORA Left 6/14/2017    Performed by Alok Finley MD at [facility]   • LUMPECTOMY LEFT      left 2007   • OTHER  2007    rt breast reconstruction to \"match\" lt breast after lumpectomy and implant   • RA Obesity Father    • Lipids Mother    • Other (Other) Mother         osteoporosis/copd   • Diabetes Brother    • Diabetes Brother    • Psychiatric Brother         etoh   • Diabetes Brother        Review of Systems  Comprehensive ROS reviewed and negative ex 8.2*   MG 1.8 1.9  --   --        Recent Labs   Lab 10/06/19  1849 10/07/19  0516   ALT 23 19   AST 16 13*   ALB 2.6* 2.2*       Recent Labs   Lab 10/06/19  1849   TROP <0.045         Radiology: Ct Chest (cpt=71250)    Result Date: 10/6/2019  PROCEDURE:  C medially toward the sternum. There is air along the pectoralis muscles. There is air dissecting along the shoulder girdle into the right axilla and along the pectoralis insertion. LIMITED ABDOMEN:  Limited images of the upper abdomen are unremarkable.   B medium, the CT portion of this study is of less than optimal diagnostic quality. The CT protocol used for this PET/CT study is designed for attenuation correction and anatomic localization of PET abnormalities.  This  CT is not designed to produce, pronounced within the right upper lobe, are similar in size/distribution from recent chest CT with the dominant right upper lobe groundglass nodule demonstrating mild FDG avidity.  Differential considerations favor atypical adenomatous hyperplasia and adeno be secondary to focal pancreatitis secondary to a penetrating ulcer from the stomach. See below. SPLEEN:  No enlargement or focal lesion. KIDNEYS:  No mass, obstruction, or calcification.   There is a renal cyst anterior cortex of the right mid kidney vicente possibly ulceration. There is severe duodenitis with phlegmon along the  hepato duodenal ligament and generalized duodenal ileus involving the 2nd, 3rd and 4th portion of the duodenum. There is a 0.9 x 0.7 cm hypodense focus in the pancreatic neck.   Praveen Winter (CPT=71045), 10/03/2019, 4:47.   INDICATIONS:  Discharged after 4 day admission on Saturday, returning for continued nausea, vomiting, right sided abdominal pain  PATIENT STATED HISTORY: (As transcribed by Technologist)  Patient notes RUQ abdominal pain, no for breast ca, HTN, HL, TIA, DM2 and lung nodules s/p VATS/RUL lobectomy is admitted with complaint of ongoing nausea for the past 2 days.       RUQ pain  -CT with severe gastritis, duodenitis and possible ulcer  -IV PPI, NPO, IVFs  -GI consulted - anticipa

## 2019-10-08 ENCOUNTER — APPOINTMENT (OUTPATIENT)
Dept: GENERAL RADIOLOGY | Facility: HOSPITAL | Age: 74
DRG: 384 | End: 2019-10-08
Attending: PHYSICIAN ASSISTANT
Payer: COMMERCIAL

## 2019-10-08 VITALS
DIASTOLIC BLOOD PRESSURE: 43 MMHG | BODY MASS INDEX: 27.25 KG/M2 | SYSTOLIC BLOOD PRESSURE: 91 MMHG | RESPIRATION RATE: 18 BRPM | OXYGEN SATURATION: 94 % | WEIGHT: 169.56 LBS | TEMPERATURE: 98 F | HEART RATE: 71 BPM | HEIGHT: 66 IN

## 2019-10-08 PROCEDURE — 80048 BASIC METABOLIC PNL TOTAL CA: CPT | Performed by: INTERNAL MEDICINE

## 2019-10-08 PROCEDURE — 82962 GLUCOSE BLOOD TEST: CPT

## 2019-10-08 PROCEDURE — 71045 X-RAY EXAM CHEST 1 VIEW: CPT | Performed by: PHYSICIAN ASSISTANT

## 2019-10-08 PROCEDURE — 85025 COMPLETE CBC W/AUTO DIFF WBC: CPT | Performed by: INTERNAL MEDICINE

## 2019-10-08 PROCEDURE — 85018 HEMOGLOBIN: CPT | Performed by: INTERNAL MEDICINE

## 2019-10-08 RX ORDER — POTASSIUM CHLORIDE 20 MEQ/1
40 TABLET, EXTENDED RELEASE ORAL EVERY 4 HOURS
Status: COMPLETED | OUTPATIENT
Start: 2019-10-08 | End: 2019-10-08

## 2019-10-08 RX ORDER — PANTOPRAZOLE SODIUM 40 MG/1
40 TABLET, DELAYED RELEASE ORAL
Qty: 60 TABLET | Refills: 0 | Status: SHIPPED | OUTPATIENT
Start: 2019-10-08 | End: 2019-11-04

## 2019-10-08 NOTE — PLAN OF CARE
Assumed care of pt at [address]. Alert and oriented x4. On RA with SpO2 at 98%, lungs clear  NSR per tele. Pt denies chest pain, SOB, palpitations, and pain. VSS  Voids. Last BM 10/3  Pt is up ad peter. POC updated with pt. Will continue to monitor.      Probl assistive devices  - Assist with transfers and ambulation using safe patient handling equipment as needed  - Ensure adequate protection for wounds/incisions during mobilization  - Obtain PT/OT consults as needed  - Advance activity as appropriate  - Commun

## 2019-10-08 NOTE — PLAN OF CARE
Assumed care of patient @ 0730, A/OX4, answers questions appropriately. NSR on tele, no complaints of RUQ pain. Pt had recent R. Upper lobectomy with Dr. Quentin Izquierdo on 10/2.  CXR this AM shows improvement in lungs,  Awaiting GI re-eval, to go home with proton

## 2019-10-08 NOTE — DISCHARGE SUMMARY
General Medicine Discharge Summary     Patient ID:  Amos Whittaker  72year old  12/3/1945    Admit date: 10/6/2019    Discharge date and time: 10/8/19    Attending Physician: DO Vania Leon 10/03/2019, 4:47. [facility] , XR CHEST AP PORTABLE  (CPT=71045), 10/06/2019, 19:02.    INDICATIONS:  Discharged after 4 day admission on Saturday, returning for continued nausea, vomiting, right sided abdominal pain  TECHNIQUE:  Unenhanced multislice CT scann developed since the prior chest radiograph of 10/3/2019. Findings consistent with right upper lobectomy with collapse of the right middle lobe which appears congested and atelectatic between the hyperexpanded right upper lobe and the mediastinal pleura. different body parts and indications. The standardized uptake values (SUV) are normalized to patient body weight and indicate the highest activity concentration (SUV max) in a given disease site.  Automated exposure control and ALARA manual techniques for p chest CT follow-up in 3-6 months if no intervention is planned.     Ct Abdomen+pelvis(contrast Only)(cpt=74177)    Result Date: 10/6/2019  PROCEDURE:  CT ABDOMEN+PELVIS (CONTRAST ONLY) (CPT=74177)  COMPARISON:  [facility] , XR CHEST AP PORTABLE  (CPT=71045), 10 anteriorly of inter 1 cm. No significant lesions on the left are identified. There is an upper pole anterior cortical lesion on the left 1.9 x 1.8 cm. No obstruction. No stones. ADRENALS:  No mass or enlargement.   AORTA/VASCULAR:  No aneurysm or disse be excluded. Correlate clinically for any evidence of pancreatitis. Incidental note is made of chest wall emphysema extending along the lateral aspect of the thoracoabdominal wall and ribcage of the right upper quadrant. Status post cholecystectomy. pneumothorax. There is chest wall emphysema. There is marked atelectasis of the right upper medial lung. A decubitus chest film with the right-side up is suggested for better definition. The left lung is clear.   There is shift of the trachea to the rig mouth 2 (two) times daily as needed for constipation. acetaminophen 500 MG Oral Tab  Take 1-2 tablets (500-1,000 mg total) by mouth every 6 (six) hours as needed for Pain. lisinopril 5 MG Oral Tab  Take 5 mg by mouth daily.     atorvastatin 10 MG Oral

## 2019-10-10 NOTE — INTERVAL H&P NOTE
Pre-op Diagnosis: lung nodules    The above referenced H&P was reviewed by Jewel Dyson PA-C on 10/10/2019, the patient was examined and no significant changes have occurred in the patient's condition since the H&P was performed.   I discussed with the patie

## 2019-10-10 NOTE — DISCHARGE SUMMARY
[facility]    Discharge Summary    Danyell Liner Patient Status:  Inpatient    12/3/1945 MRN [id number]   [facility] 8NE-A Attending No att. providers found   [address] Day # 3 PCP Dorothy Valadez MD     Date of Admission: 10/2/2019 Jia Huynh bronchoscopy, right video assisted wedge resection, right upper lobectomy, lymph node dissection   on 10/2/2019    Hospital Course: Patient underwent surgery on 10/2/2019. She did well post-operatively.  After an initial stay in the ICU, she went to the gen HCl 1000 MG Tabs  Commonly known as:  GLUCOPHAGE      Take 1 tablet (1,000 mg total) by mouth daily. Quantity:  90 tablet  Refills:  3     VITAMIN D OR      Take  by mouth.    Refills:  0           Where to Get Your Medications      These medications were

## 2019-10-18 PROBLEM — R93.89 ABNORMAL CHEST CT: Status: ACTIVE | Noted: 2019-10-18

## 2019-10-20 NOTE — PROGRESS NOTES
EGD showed large gastric ulcer and superficial duodenal ulcers. Here are the biopsy/pathology findings from your recent EGD (upper endoscopy) and colonoscopy:     The biopsy/pathology findings from your upper endoscopy showed:    No gastritis and no H py

## 2019-10-22 PROBLEM — C34.90 ADENOCARCINOMA, LUNG (HCC): Status: ACTIVE | Noted: 2019-10-22

## 2019-10-23 ENCOUNTER — OFFICE VISIT (OUTPATIENT)
Dept: HEMATOLOGY/ONCOLOGY | Facility: HOSPITAL | Age: 74
End: 2019-10-23
Attending: THORACIC SURGERY (CARDIOTHORACIC VASCULAR SURGERY)
Payer: COMMERCIAL

## 2019-10-23 VITALS
HEIGHT: 66 IN | OXYGEN SATURATION: 99 % | SYSTOLIC BLOOD PRESSURE: 129 MMHG | HEART RATE: 111 BPM | BODY MASS INDEX: 26.2 KG/M2 | RESPIRATION RATE: 16 BRPM | WEIGHT: 163 LBS | DIASTOLIC BLOOD PRESSURE: 84 MMHG | TEMPERATURE: 96 F

## 2019-10-23 DIAGNOSIS — C34.91 ADENOCARCINOMA OF RIGHT LUNG (HCC): Primary | ICD-10-CM

## 2019-10-23 PROCEDURE — 99211 OFF/OP EST MAY X REQ PHY/QHP: CPT

## 2019-10-25 NOTE — PROGRESS NOTES
Thoracic Surgery Postoperative Note    CC: initial postop visit    She is a 72year old female who presents today in follow up after R VATs upper lobectomy performed on 10/2/19. She was readmitted the day after discharge, on 10/6 for gastritis.  She is doin tablet, Rfl: 3  •  metFORMIN HCl 1000 MG Oral Tab, Take 1 tablet (1,000 mg total) by mouth daily. , Disp: 90 tablet, Rfl: 3  •  Insulin Pen Needle (NOVOTWIST) 32G X 5 MM Does not apply Misc, Take as directed, Disp: 100 each, Rfl: 3  •  Liraglutide (Britton Pu) She has no restrictions and I have encouraged an active lifestyle. Plan:  Encouraged continued exercise and incentive spirometer use.   No activity restrictions  Continue surveillance with her oncologist (hx breast cancer)  Follow up with pulmonology  F

## 2020-05-14 PROBLEM — Z85.118 HISTORY OF LUNG CANCER: Status: ACTIVE | Noted: 2020-05-14

## 2021-01-17 ENCOUNTER — APPOINTMENT (OUTPATIENT)
Dept: GENERAL RADIOLOGY | Age: 76
End: 2021-01-17

## 2021-01-17 ENCOUNTER — APPOINTMENT (OUTPATIENT)
Dept: CT IMAGING | Age: 76
End: 2021-01-17

## 2021-01-17 ENCOUNTER — HOSPITAL ENCOUNTER (EMERGENCY)
Age: 76
Discharge: HOME | End: 2021-01-18
Attending: STUDENT IN AN ORGANIZED HEALTH CARE EDUCATION/TRAINING PROGRAM

## 2021-01-17 DIAGNOSIS — R10.12 LEFT UPPER QUADRANT ABDOMINAL PAIN: Primary | ICD-10-CM

## 2021-01-17 LAB
ALBUMIN SERPL-MCNC: 4 G/DL (ref 3.6–5.1)
ALBUMIN/GLOB SERPL: 1.2 {RATIO} (ref 1–2.4)
ALP SERPL-CCNC: 94 UNITS/L (ref 45–117)
ALT SERPL-CCNC: 27 UNITS/L
ANION GAP SERPL CALC-SCNC: 14 MMOL/L (ref 10–20)
APPEARANCE UR: CLEAR
AST SERPL-CCNC: 8 UNITS/L
BACTERIA #/AREA URNS HPF: ABNORMAL /HPF
BASOPHILS # BLD: 0.1 K/MCL (ref 0–0.3)
BASOPHILS NFR BLD: 1 %
BILIRUB SERPL-MCNC: 0.5 MG/DL (ref 0.2–1)
BILIRUB UR QL STRIP: NEGATIVE
BUN SERPL-MCNC: 13 MG/DL (ref 6–20)
BUN/CREAT SERPL: 19 (ref 7–25)
CALCIUM SERPL-MCNC: 9.8 MG/DL (ref 8.4–10.2)
CHLORIDE SERPL-SCNC: 96 MMOL/L (ref 98–107)
CO2 SERPL-SCNC: 28 MMOL/L (ref 21–32)
COLOR UR: YELLOW
CREAT SERPL-MCNC: 0.69 MG/DL (ref 0.51–0.95)
D DIMER PPP FEU-MCNC: 2.4 MG/L (FEU)
DEPRECATED RDW RBC: 45.4 FL (ref 39–50)
EOSINOPHIL # BLD: 0.1 K/MCL (ref 0–0.5)
EOSINOPHIL NFR BLD: 1 %
ERYTHROCYTE [DISTWIDTH] IN BLOOD: 14.2 % (ref 11–15)
FASTING DURATION TIME PATIENT: ABNORMAL H
GFR SERPLBLD BASED ON 1.73 SQ M-ARVRAT: 87 ML/MIN/1.73M2
GLOBULIN SER-MCNC: 3.3 G/DL (ref 2–4)
GLUCOSE SERPL-MCNC: 150 MG/DL (ref 65–99)
GLUCOSE UR STRIP-MCNC: NEGATIVE MG/DL
HCT VFR BLD CALC: 37.8 % (ref 36–46.5)
HGB BLD-MCNC: 11.7 G/DL (ref 12–15.5)
HGB UR QL STRIP: NEGATIVE
HYALINE CASTS #/AREA URNS LPF: ABNORMAL /LPF
IMM GRANULOCYTES # BLD AUTO: 0 K/MCL (ref 0–0.2)
IMM GRANULOCYTES # BLD: 0 %
KETONES UR STRIP-MCNC: 15 MG/DL
LEUKOCYTE ESTERASE UR QL STRIP: ABNORMAL
LIPASE SERPL-CCNC: 249 UNITS/L (ref 73–393)
LYMPHOCYTES # BLD: 0.9 K/MCL (ref 1–4)
LYMPHOCYTES NFR BLD: 9 %
MCH RBC QN AUTO: 27 PG (ref 26–34)
MCHC RBC AUTO-ENTMCNC: 31 G/DL (ref 32–36.5)
MCV RBC AUTO: 87.3 FL (ref 78–100)
MONOCYTES # BLD: 0.5 K/MCL (ref 0.3–0.9)
MONOCYTES NFR BLD: 5 %
NEUTROPHILS # BLD: 7.9 K/MCL (ref 1.8–7.7)
NEUTROPHILS NFR BLD: 84 %
NITRITE UR QL STRIP: NEGATIVE
NRBC BLD MANUAL-RTO: 0 /100 WBC
PH UR STRIP: 6 [PH] (ref 5–7)
PLATELET # BLD AUTO: 330 K/MCL (ref 140–450)
POTASSIUM SERPL-SCNC: 3.8 MMOL/L (ref 3.4–5.1)
PROT SERPL-MCNC: 7.3 G/DL (ref 6.4–8.2)
PROT UR STRIP-MCNC: NEGATIVE MG/DL
RBC # BLD: 4.33 MIL/MCL (ref 4–5.2)
RBC #/AREA URNS HPF: ABNORMAL /HPF
SODIUM SERPL-SCNC: 134 MMOL/L (ref 135–145)
SP GR UR STRIP: 1.02 (ref 1–1.03)
SQUAMOUS #/AREA URNS HPF: ABNORMAL /HPF
TROPONIN I SERPL HS-MCNC: <0.02 NG/ML
UROBILINOGEN UR STRIP-MCNC: 0.2 MG/DL
WBC # BLD: 9.4 K/MCL (ref 4.2–11)
WBC #/AREA URNS HPF: ABNORMAL /HPF

## 2021-01-17 PROCEDURE — 93005 ELECTROCARDIOGRAM TRACING: CPT

## 2021-01-17 PROCEDURE — 81001 URINALYSIS AUTO W/SCOPE: CPT | Performed by: PHYSICIAN ASSISTANT

## 2021-01-17 PROCEDURE — 74177 CT ABD & PELVIS W/CONTRAST: CPT

## 2021-01-17 PROCEDURE — 99284 EMERGENCY DEPT VISIT MOD MDM: CPT

## 2021-01-17 PROCEDURE — 10002805 HB CONTRAST AGENT: Performed by: PHYSICIAN ASSISTANT

## 2021-01-17 PROCEDURE — 10002800 HB RX 250 W HCPCS: Performed by: PHYSICIAN ASSISTANT

## 2021-01-17 PROCEDURE — 96374 THER/PROPH/DIAG INJ IV PUSH: CPT

## 2021-01-17 PROCEDURE — 96361 HYDRATE IV INFUSION ADD-ON: CPT

## 2021-01-17 PROCEDURE — 96375 TX/PRO/DX INJ NEW DRUG ADDON: CPT

## 2021-01-17 PROCEDURE — 85025 COMPLETE CBC W/AUTO DIFF WBC: CPT | Performed by: PHYSICIAN ASSISTANT

## 2021-01-17 PROCEDURE — 71045 X-RAY EXAM CHEST 1 VIEW: CPT

## 2021-01-17 PROCEDURE — 83690 ASSAY OF LIPASE: CPT | Performed by: PHYSICIAN ASSISTANT

## 2021-01-17 PROCEDURE — 84484 ASSAY OF TROPONIN QUANT: CPT | Performed by: PHYSICIAN ASSISTANT

## 2021-01-17 PROCEDURE — 80053 COMPREHEN METABOLIC PANEL: CPT | Performed by: PHYSICIAN ASSISTANT

## 2021-01-17 PROCEDURE — 85379 FIBRIN DEGRADATION QUANT: CPT | Performed by: PHYSICIAN ASSISTANT

## 2021-01-17 PROCEDURE — 10002807 HB RX 258: Performed by: STUDENT IN AN ORGANIZED HEALTH CARE EDUCATION/TRAINING PROGRAM

## 2021-01-17 RX ORDER — ONDANSETRON 2 MG/ML
4 INJECTION INTRAMUSCULAR; INTRAVENOUS ONCE
Status: COMPLETED | OUTPATIENT
Start: 2021-01-17 | End: 2021-01-17

## 2021-01-17 RX ADMIN — SODIUM CHLORIDE 1000 ML: 0.9 INJECTION, SOLUTION INTRAVENOUS at 23:11

## 2021-01-17 RX ADMIN — MORPHINE SULFATE 2 MG: 2 INJECTION, SOLUTION INTRAMUSCULAR; INTRAVENOUS at 20:24

## 2021-01-17 RX ADMIN — ONDANSETRON 4 MG: 2 INJECTION INTRAMUSCULAR; INTRAVENOUS at 20:24

## 2021-01-17 RX ADMIN — IOHEXOL 75 ML: 350 INJECTION, SOLUTION INTRAVENOUS at 21:00

## 2021-01-17 ASSESSMENT — ENCOUNTER SYMPTOMS
DIARRHEA: 0
EYE PAIN: 0
ABDOMINAL PAIN: 1
LIGHT-HEADEDNESS: 0
CHILLS: 0
CONSTIPATION: 0
SHORTNESS OF BREATH: 0
BACK PAIN: 0
DIZZINESS: 0
VOMITING: 0
NUMBNESS: 0
SORE THROAT: 0
RHINORRHEA: 0
COUGH: 0
SINUS PAIN: 0
FEVER: 0
NAUSEA: 1
HEADACHES: 0
WEAKNESS: 0

## 2021-01-17 ASSESSMENT — VISUAL ACUITY: OU: 1

## 2021-01-18 ENCOUNTER — APPOINTMENT (OUTPATIENT)
Dept: NUCLEAR MEDICINE | Age: 76
End: 2021-01-18
Attending: STUDENT IN AN ORGANIZED HEALTH CARE EDUCATION/TRAINING PROGRAM

## 2021-01-18 VITALS
RESPIRATION RATE: 18 BRPM | HEART RATE: 91 BPM | TEMPERATURE: 99.1 F | OXYGEN SATURATION: 99 % | DIASTOLIC BLOOD PRESSURE: 76 MMHG | SYSTOLIC BLOOD PRESSURE: 127 MMHG

## 2021-01-18 LAB
P AXIS (DEGREES): 53
PR-INTERVAL (MSEC): 165
QRS-INTERVAL (MSEC): 82
QT-INTERVAL (MSEC): 313
QTC: 377
R AXIS (DEGREES): -61
REPORT TEXT: NORMAL
T AXIS (DEGREES): 65
VENTRICULAR RATE EKG/MIN (BPM): 109

## 2021-01-18 PROCEDURE — 10006150 HB RX 343: Performed by: STUDENT IN AN ORGANIZED HEALTH CARE EDUCATION/TRAINING PROGRAM

## 2021-01-18 PROCEDURE — A9540 TC99M MAA: HCPCS | Performed by: STUDENT IN AN ORGANIZED HEALTH CARE EDUCATION/TRAINING PROGRAM

## 2021-01-18 PROCEDURE — 78582 LUNG VENTILAT&PERFUS IMAGING: CPT

## 2021-01-18 PROCEDURE — A9567 TECHNETIUM TC-99M AEROSOL: HCPCS | Performed by: STUDENT IN AN ORGANIZED HEALTH CARE EDUCATION/TRAINING PROGRAM

## 2021-01-18 RX ADMIN — KIT FOR THE PREPARATION OF TECHNETIUM TC 99M PENTETATE 40 MILLICURIE: 20 INJECTION, POWDER, LYOPHILIZED, FOR SOLUTION INTRAVENOUS; RESPIRATORY (INHALATION) at 01:00

## 2021-01-18 RX ADMIN — KIT FOR THE PREPARATION OF TECHNETIUM TC 99M ALBUMIN AGGREGATED 6 MILLICURIE: 2.5 INJECTION, POWDER, FOR SOLUTION INTRAVENOUS at 01:00

## 2021-01-27 DIAGNOSIS — Z23 NEED FOR VACCINATION: ICD-10-CM

## 2021-02-05 ENCOUNTER — IMMUNIZATION (OUTPATIENT)
Dept: LAB | Age: 76
End: 2021-02-05
Attending: HOSPITALIST
Payer: COMMERCIAL

## 2021-02-05 DIAGNOSIS — Z23 NEED FOR VACCINATION: Primary | ICD-10-CM

## 2021-02-05 PROCEDURE — 0001A SARSCOV2 VAC 30MCG/0.3ML IM: CPT

## 2021-02-11 PROBLEM — R42 DIZZY SPELLS: Status: ACTIVE | Noted: 2021-02-11

## 2021-02-11 PROBLEM — G43.109 MIGRAINE WITH AURA AND WITHOUT STATUS MIGRAINOSUS, NOT INTRACTABLE: Status: ACTIVE | Noted: 2021-02-11

## 2021-02-26 ENCOUNTER — IMMUNIZATION (OUTPATIENT)
Dept: LAB | Age: 76
End: 2021-02-26
Attending: HOSPITALIST
Payer: COMMERCIAL

## 2021-02-26 DIAGNOSIS — Z23 NEED FOR VACCINATION: Primary | ICD-10-CM

## 2021-02-26 PROCEDURE — 0002A SARSCOV2 VAC 30MCG/0.3ML IM: CPT

## 2021-03-10 PROBLEM — D50.9 IRON DEFICIENCY ANEMIA, UNSPECIFIED IRON DEFICIENCY ANEMIA TYPE: Status: ACTIVE | Noted: 2021-03-10

## 2021-03-15 DIAGNOSIS — Z23 NEED FOR VACCINATION: ICD-10-CM

## 2021-05-13 RX ORDER — THIAMINE MONONITRATE (VIT B1) 100 MG
100 TABLET ORAL DAILY
COMMUNITY

## 2021-05-13 RX ORDER — SODIUM CHLORIDE, SODIUM LACTATE, POTASSIUM CHLORIDE, CALCIUM CHLORIDE 600; 310; 30; 20 MG/100ML; MG/100ML; MG/100ML; MG/100ML
INJECTION, SOLUTION INTRAVENOUS CONTINUOUS
Status: CANCELLED | OUTPATIENT
Start: 2021-05-13

## 2021-05-24 PROBLEM — I10 ESSENTIAL HYPERTENSION, BENIGN: Status: ACTIVE | Noted: 2021-05-24

## 2021-06-07 ENCOUNTER — LAB ENCOUNTER (OUTPATIENT)
Dept: LAB | Age: 76
End: 2021-06-07
Attending: INTERNAL MEDICINE
Payer: COMMERCIAL

## 2021-06-07 DIAGNOSIS — K86.9 PANCREATIC LESION: ICD-10-CM

## 2021-06-09 ENCOUNTER — ANESTHESIA (OUTPATIENT)
Dept: ENDOSCOPY | Facility: HOSPITAL | Age: 76
End: 2021-06-09
Payer: COMMERCIAL

## 2021-06-09 ENCOUNTER — HOSPITAL ENCOUNTER (OUTPATIENT)
Facility: HOSPITAL | Age: 76
Setting detail: HOSPITAL OUTPATIENT SURGERY
Discharge: HOME | End: 2021-06-09
Attending: INTERNAL MEDICINE | Admitting: INTERNAL MEDICINE
Payer: COMMERCIAL

## 2021-06-09 ENCOUNTER — ANESTHESIA EVENT (OUTPATIENT)
Dept: ENDOSCOPY | Facility: HOSPITAL | Age: 76
End: 2021-06-09
Payer: COMMERCIAL

## 2021-06-09 VITALS
SYSTOLIC BLOOD PRESSURE: 104 MMHG | WEIGHT: 185 LBS | OXYGEN SATURATION: 96 % | HEIGHT: 65.5 IN | TEMPERATURE: 97 F | HEART RATE: 75 BPM | DIASTOLIC BLOOD PRESSURE: 67 MMHG | BODY MASS INDEX: 30.45 KG/M2 | RESPIRATION RATE: 20 BRPM

## 2021-06-09 DIAGNOSIS — D49.0 IPMN (INTRADUCTAL PAPILLARY MUCINOUS NEOPLASM): ICD-10-CM

## 2021-06-09 DIAGNOSIS — K86.9 PANCREATIC LESION: Primary | ICD-10-CM

## 2021-06-09 PROCEDURE — 0DJ08ZZ INSPECTION OF UPPER INTESTINAL TRACT, VIA NATURAL OR ARTIFICIAL OPENING ENDOSCOPIC: ICD-10-PCS | Performed by: INTERNAL MEDICINE

## 2021-06-09 PROCEDURE — 82962 GLUCOSE BLOOD TEST: CPT

## 2021-06-09 RX ORDER — NALOXONE HYDROCHLORIDE 0.4 MG/ML
80 INJECTION, SOLUTION INTRAMUSCULAR; INTRAVENOUS; SUBCUTANEOUS AS NEEDED
Status: CANCELLED | OUTPATIENT
Start: 2021-06-09 | End: 2021-06-09

## 2021-06-09 RX ORDER — SODIUM CHLORIDE, SODIUM LACTATE, POTASSIUM CHLORIDE, CALCIUM CHLORIDE 600; 310; 30; 20 MG/100ML; MG/100ML; MG/100ML; MG/100ML
INJECTION, SOLUTION INTRAVENOUS CONTINUOUS
Status: DISCONTINUED | OUTPATIENT
Start: 2021-06-09 | End: 2021-06-09

## 2021-06-09 RX ORDER — DEXTROSE MONOHYDRATE 25 G/50ML
50 INJECTION, SOLUTION INTRAVENOUS
Status: CANCELLED | OUTPATIENT
Start: 2021-06-09

## 2021-06-09 RX ORDER — LIDOCAINE HYDROCHLORIDE 10 MG/ML
INJECTION, SOLUTION EPIDURAL; INFILTRATION; INTRACAUDAL; PERINEURAL AS NEEDED
Status: DISCONTINUED | OUTPATIENT
Start: 2021-06-09 | End: 2021-06-09 | Stop reason: SURG

## 2021-06-09 RX ORDER — SODIUM CHLORIDE, SODIUM LACTATE, POTASSIUM CHLORIDE, CALCIUM CHLORIDE 600; 310; 30; 20 MG/100ML; MG/100ML; MG/100ML; MG/100ML
INJECTION, SOLUTION INTRAVENOUS CONTINUOUS
Status: CANCELLED | OUTPATIENT
Start: 2021-06-09

## 2021-06-09 RX ORDER — DEXTROSE MONOHYDRATE 25 G/50ML
50 INJECTION, SOLUTION INTRAVENOUS
Status: DISCONTINUED | OUTPATIENT
Start: 2021-06-09 | End: 2021-06-09

## 2021-06-09 RX ORDER — ONDANSETRON 2 MG/ML
4 INJECTION INTRAMUSCULAR; INTRAVENOUS AS NEEDED
Status: CANCELLED | OUTPATIENT
Start: 2021-06-09 | End: 2021-06-09

## 2021-06-09 RX ADMIN — LIDOCAINE HYDROCHLORIDE 50 MG: 10 INJECTION, SOLUTION EPIDURAL; INFILTRATION; INTRACAUDAL; PERINEURAL at 08:50:00

## 2021-06-09 RX ADMIN — SODIUM CHLORIDE, SODIUM LACTATE, POTASSIUM CHLORIDE, CALCIUM CHLORIDE: 600; 310; 30; 20 INJECTION, SOLUTION INTRAVENOUS at 09:05:00

## 2021-06-09 NOTE — H&P
History & Physical Examination    Patient Name: Kaitlyn Morris  MRN: [id number]  [facility]: [id number]  YOB: 1945    Diagnosis: Pancreatic lesion [K86.9]  IPMN (intraductal papillary mucinous neoplasm) [D49.0]      Present Illness:  Conception Spoon I Cumme • ANGIOGRAM     • APPENDECTOMY  2002   • BREAST RECONSTRUC W LAT DORSI FLAP  9/17/08    implant removed due to infection   • CATARACT     • CHOLECYSTECTOMY  1977    age 29   • COLONOSCOPY  12/31/03= Diverticulosis, Hemorrhoids    Repeat 2024   • COLONOSC [ x ] I have discussed the risks and benefits and alternatives with the patient/family. They understand and agree to proceed with plan of care. [ x ] I have reviewed the History and Physical done within the last 30 days. Any changes noted above.

## 2021-06-09 NOTE — ANESTHESIA PREPROCEDURE EVALUATION
PRE-OP EVALUATION    Patient Name: Keren Hayes    Admit Diagnosis: Pancreatic lesion [K86.9]  IPMN (intraductal papillary mucinous neoplasm) [D49.0]    Pre-op Diagnosis: Pancreatic lesion [K86.9]  IPMN (intraductal papillary mucinous neoplasm) [A45. 0] 6/8/2021 at Unknown time  Magnesium 100 MG Oral Tab, Take by mouth., Disp: , Rfl: , 6/2/2021 at Unknown time  PROPRANOLOL HCL 20 MG Oral Tab, TAKE 1 TABLET(20 MG) BY MOUTH TWICE DAILY, Disp: 60 tablet, Rfl: 11, 6/8/2021 at Unknown time  Albuterol Sulfate H ([facility] [address]     Other seborrheic keratosis     Chronic bilateral low back pain with sciatica     Spondylolisthesis, lumbar region     Spinal stenosis, lumbar region, with neurogenic claudication     Transient cerebral ischemia, unspecified type     Hyponatremia normal.                 Other findings            ASA: 3   Plan: MAC  NPO status verified and patient meets guidelines. Comment: Discussed MAC. Questions answered. Risks explained.   Plan/risks discussed with: patient                Present on Admiss

## 2021-06-09 NOTE — OPERATIVE REPORT
[address] OPERATIVE REPORT   PATIENT NAME: Slime Souza  MRN: [id number]  DATE OF OPERATION: 6/9/2021  PREOPERATIVE DIAGNOSIS: pancreatic cyst  POSTOPERATIVE DIAGNOSIS:    1.  Multiple small cysts in body and 1 in head of pancreas communicating wi The main pancreas duct was within normal limits measuring 2.7 mm in the head and 1.5 mm within the body. Cystic lesions in the body of the pancreas measures 4.3 mm, 4.0 mm, 3.2 mm.   The largest cystic lesion was seen in the neck of the pancreas measuring

## 2021-06-09 NOTE — ANESTHESIA POSTPROCEDURE EVALUATION
Doris Lopez 794 Patient Status:  Hospital Outpatient Surgery   Age/Gender 70year old female MRN [id number]   Location [id number] [place] [address] Attending Nasima Perez MD   Hosp Day # 0 PCP MD Ronnie Gross

## 2022-10-31 ENCOUNTER — HOSPITAL ENCOUNTER (EMERGENCY)
Age: 77
Discharge: HOME | End: 2022-10-31
Attending: EMERGENCY MEDICINE

## 2022-10-31 VITALS
HEART RATE: 78 BPM | HEIGHT: 66 IN | OXYGEN SATURATION: 93 % | TEMPERATURE: 97.9 F | BODY MASS INDEX: 30.53 KG/M2 | RESPIRATION RATE: 15 BRPM | SYSTOLIC BLOOD PRESSURE: 101 MMHG | DIASTOLIC BLOOD PRESSURE: 54 MMHG | WEIGHT: 190 LBS

## 2022-10-31 DIAGNOSIS — T88.7XXA MEDICATION SIDE EFFECT: Primary | ICD-10-CM

## 2022-10-31 LAB
ALBUMIN SERPL-MCNC: 3.6 G/DL (ref 3.6–5.1)
ALBUMIN/GLOB SERPL: 1.1 {RATIO} (ref 1–2.4)
ALP SERPL-CCNC: 97 UNITS/L (ref 45–117)
ALT SERPL-CCNC: 47 UNITS/L
ANION GAP SERPL CALC-SCNC: 13 MMOL/L (ref 7–19)
AST SERPL-CCNC: 24 UNITS/L
BASOPHILS # BLD: 0.1 K/MCL (ref 0–0.3)
BASOPHILS NFR BLD: 1 %
BILIRUB SERPL-MCNC: 0.2 MG/DL (ref 0.2–1)
BUN SERPL-MCNC: 23 MG/DL (ref 6–20)
BUN/CREAT SERPL: 28 (ref 7–25)
CALCIUM SERPL-MCNC: 9.1 MG/DL (ref 8.4–10.2)
CHLORIDE SERPL-SCNC: 100 MMOL/L (ref 97–110)
CO2 SERPL-SCNC: 26 MMOL/L (ref 21–32)
CREAT SERPL-MCNC: 0.81 MG/DL (ref 0.51–0.95)
DEPRECATED RDW RBC: 46.9 FL (ref 39–50)
EOSINOPHIL # BLD: 0.1 K/MCL (ref 0–0.5)
EOSINOPHIL NFR BLD: 2 %
ERYTHROCYTE [DISTWIDTH] IN BLOOD: 14.5 % (ref 11–15)
FASTING DURATION TIME PATIENT: ABNORMAL H
GFR SERPLBLD BASED ON 1.73 SQ M-ARVRAT: 76 ML/MIN
GLOBULIN SER-MCNC: 3.3 G/DL (ref 2–4)
GLUCOSE BLDC GLUCOMTR-MCNC: 184 MG/DL (ref 70–99)
GLUCOSE SERPL-MCNC: 172 MG/DL (ref 70–99)
HCT VFR BLD CALC: 36.4 % (ref 36–46.5)
HGB BLD-MCNC: 11.5 G/DL (ref 12–15.5)
IMM GRANULOCYTES # BLD AUTO: 0 K/MCL (ref 0–0.2)
IMM GRANULOCYTES # BLD: 0 %
LIPASE SERPL-CCNC: 257 UNITS/L (ref 73–393)
LYMPHOCYTES # BLD: 0.9 K/MCL (ref 1–4)
LYMPHOCYTES NFR BLD: 14 %
MCH RBC QN AUTO: 28 PG (ref 26–34)
MCHC RBC AUTO-ENTMCNC: 31.6 G/DL (ref 32–36.5)
MCV RBC AUTO: 88.6 FL (ref 78–100)
MONOCYTES # BLD: 0.6 K/MCL (ref 0.3–0.9)
MONOCYTES NFR BLD: 9 %
NEUTROPHILS # BLD: 4.8 K/MCL (ref 1.8–7.7)
NEUTROPHILS NFR BLD: 74 %
NRBC BLD MANUAL-RTO: 0 /100 WBC
PLATELET # BLD AUTO: 307 K/MCL (ref 140–450)
POTASSIUM SERPL-SCNC: 3.9 MMOL/L (ref 3.4–5.1)
PROT SERPL-MCNC: 6.9 G/DL (ref 6.4–8.2)
RBC # BLD: 4.11 MIL/MCL (ref 4–5.2)
SODIUM SERPL-SCNC: 135 MMOL/L (ref 135–145)
TROPONIN I SERPL DL<=0.01 NG/ML-MCNC: 7 NG/L
WBC # BLD: 6.5 K/MCL (ref 4.2–11)

## 2022-10-31 PROCEDURE — 96361 HYDRATE IV INFUSION ADD-ON: CPT

## 2022-10-31 PROCEDURE — 10002807 HB RX 258: Performed by: EMERGENCY MEDICINE

## 2022-10-31 PROCEDURE — 80053 COMPREHEN METABOLIC PANEL: CPT | Performed by: EMERGENCY MEDICINE

## 2022-10-31 PROCEDURE — 85025 COMPLETE CBC W/AUTO DIFF WBC: CPT | Performed by: EMERGENCY MEDICINE

## 2022-10-31 PROCEDURE — 99285 EMERGENCY DEPT VISIT HI MDM: CPT

## 2022-10-31 PROCEDURE — 93010 ELECTROCARDIOGRAM REPORT: CPT | Performed by: INTERNAL MEDICINE

## 2022-10-31 PROCEDURE — 84484 ASSAY OF TROPONIN QUANT: CPT | Performed by: EMERGENCY MEDICINE

## 2022-10-31 PROCEDURE — 96374 THER/PROPH/DIAG INJ IV PUSH: CPT

## 2022-10-31 PROCEDURE — 93005 ELECTROCARDIOGRAM TRACING: CPT | Performed by: EMERGENCY MEDICINE

## 2022-10-31 PROCEDURE — 82962 GLUCOSE BLOOD TEST: CPT

## 2022-10-31 PROCEDURE — 10002800 HB RX 250 W HCPCS: Performed by: EMERGENCY MEDICINE

## 2022-10-31 PROCEDURE — 83690 ASSAY OF LIPASE: CPT | Performed by: EMERGENCY MEDICINE

## 2022-10-31 RX ORDER — ONDANSETRON 2 MG/ML
4 INJECTION INTRAMUSCULAR; INTRAVENOUS ONCE
Status: COMPLETED | OUTPATIENT
Start: 2022-10-31 | End: 2022-10-31

## 2022-10-31 RX ADMIN — ONDANSETRON 4 MG: 2 INJECTION INTRAMUSCULAR; INTRAVENOUS at 19:24

## 2022-10-31 RX ADMIN — SODIUM CHLORIDE 1000 ML: 0.9 INJECTION, SOLUTION INTRAVENOUS at 18:23

## 2022-11-01 LAB
P AXIS (DEGREES): -6
PR-INTERVAL (MSEC): 170
QRS-INTERVAL (MSEC): 90
QT-INTERVAL (MSEC): 376
QTC: 402
R AXIS (DEGREES): -23
REPORT TEXT: NORMAL
T AXIS (DEGREES): 52
VENTRICULAR RATE EKG/MIN (BPM): 73

## 2022-12-19 ENCOUNTER — APPOINTMENT (OUTPATIENT)
Dept: GENERAL RADIOLOGY | Age: 77
End: 2022-12-19
Attending: EMERGENCY MEDICINE

## 2022-12-19 ENCOUNTER — HOSPITAL ENCOUNTER (EMERGENCY)
Age: 77
Discharge: HOME | End: 2022-12-19
Attending: EMERGENCY MEDICINE

## 2022-12-19 VITALS
WEIGHT: 190.7 LBS | RESPIRATION RATE: 17 BRPM | BODY MASS INDEX: 30.65 KG/M2 | HEART RATE: 72 BPM | OXYGEN SATURATION: 94 % | DIASTOLIC BLOOD PRESSURE: 80 MMHG | TEMPERATURE: 96.3 F | HEIGHT: 66 IN | SYSTOLIC BLOOD PRESSURE: 101 MMHG

## 2022-12-19 DIAGNOSIS — U07.1 COVID-19 VIRUS DETECTED: Primary | ICD-10-CM

## 2022-12-19 LAB
ALBUMIN SERPL-MCNC: 3.3 G/DL (ref 3.6–5.1)
ALBUMIN/GLOB SERPL: 1 {RATIO} (ref 1–2.4)
ALP SERPL-CCNC: 93 UNITS/L (ref 45–117)
ALT SERPL-CCNC: 43 UNITS/L
ANION GAP SERPL CALC-SCNC: 14 MMOL/L (ref 7–19)
AST SERPL-CCNC: 34 UNITS/L
BASOPHILS # BLD: 0 K/MCL (ref 0–0.3)
BASOPHILS NFR BLD: 0 %
BILIRUB SERPL-MCNC: 0.2 MG/DL (ref 0.2–1)
BUN SERPL-MCNC: 25 MG/DL (ref 6–20)
BUN/CREAT SERPL: 29 (ref 7–25)
CALCIUM SERPL-MCNC: 9.2 MG/DL (ref 8.4–10.2)
CHLORIDE SERPL-SCNC: 101 MMOL/L (ref 97–110)
CO2 SERPL-SCNC: 27 MMOL/L (ref 21–32)
CREAT SERPL-MCNC: 0.86 MG/DL (ref 0.51–0.95)
DEPRECATED RDW RBC: 49 FL (ref 39–50)
EOSINOPHIL # BLD: 0.1 K/MCL (ref 0–0.5)
EOSINOPHIL NFR BLD: 1 %
ERYTHROCYTE [DISTWIDTH] IN BLOOD: 15.1 % (ref 11–15)
FASTING DURATION TIME PATIENT: ABNORMAL H
FLUAV RNA RESP QL NAA+PROBE: NOT DETECTED
FLUBV RNA RESP QL NAA+PROBE: NOT DETECTED
GFR SERPLBLD BASED ON 1.73 SQ M-ARVRAT: 70 ML/MIN
GLOBULIN SER-MCNC: 3.4 G/DL (ref 2–4)
GLUCOSE SERPL-MCNC: 187 MG/DL (ref 70–99)
HCT VFR BLD CALC: 37.6 % (ref 36–46.5)
HGB BLD-MCNC: 11.4 G/DL (ref 12–15.5)
IMM GRANULOCYTES # BLD AUTO: 0 K/MCL (ref 0–0.2)
IMM GRANULOCYTES # BLD: 0 %
LYMPHOCYTES # BLD: 0.9 K/MCL (ref 1–4)
LYMPHOCYTES NFR BLD: 12 %
MCH RBC QN AUTO: 27 PG (ref 26–34)
MCHC RBC AUTO-ENTMCNC: 30.3 G/DL (ref 32–36.5)
MCV RBC AUTO: 89.1 FL (ref 78–100)
MONOCYTES # BLD: 0.7 K/MCL (ref 0.3–0.9)
MONOCYTES NFR BLD: 9 %
NEUTROPHILS # BLD: 5.6 K/MCL (ref 1.8–7.7)
NEUTROPHILS NFR BLD: 78 %
NRBC BLD MANUAL-RTO: 0 /100 WBC
PLATELET # BLD AUTO: 289 K/MCL (ref 140–450)
POTASSIUM SERPL-SCNC: 3.9 MMOL/L (ref 3.4–5.1)
PROT SERPL-MCNC: 6.7 G/DL (ref 6.4–8.2)
RBC # BLD: 4.22 MIL/MCL (ref 4–5.2)
RSV AG NPH QL IA.RAPID: NOT DETECTED
SARS-COV-2 N GENE CT SPEC QN NAA N2: 19.1
SARS-COV-2 RNA RESP QL NAA+PROBE: DETECTED
SERVICE CMNT-IMP: ABNORMAL
SODIUM SERPL-SCNC: 138 MMOL/L (ref 135–145)
TROPONIN I SERPL DL<=0.01 NG/ML-MCNC: 8 NG/L
WBC # BLD: 7.3 K/MCL (ref 4.2–11)

## 2022-12-19 PROCEDURE — 94640 AIRWAY INHALATION TREATMENT: CPT

## 2022-12-19 PROCEDURE — 0241U COVID/FLU/RSV PANEL: CPT | Performed by: EMERGENCY MEDICINE

## 2022-12-19 PROCEDURE — 10002800 HB RX 250 W HCPCS: Performed by: EMERGENCY MEDICINE

## 2022-12-19 PROCEDURE — 93005 ELECTROCARDIOGRAM TRACING: CPT | Performed by: EMERGENCY MEDICINE

## 2022-12-19 PROCEDURE — 10004180 HB COUNTER-TRANSPORT

## 2022-12-19 PROCEDURE — 85025 COMPLETE CBC W/AUTO DIFF WBC: CPT | Performed by: EMERGENCY MEDICINE

## 2022-12-19 PROCEDURE — 80053 COMPREHEN METABOLIC PANEL: CPT | Performed by: EMERGENCY MEDICINE

## 2022-12-19 PROCEDURE — 10002801 HB RX 250 W/O HCPCS: Performed by: EMERGENCY MEDICINE

## 2022-12-19 PROCEDURE — 84484 ASSAY OF TROPONIN QUANT: CPT | Performed by: EMERGENCY MEDICINE

## 2022-12-19 PROCEDURE — 99285 EMERGENCY DEPT VISIT HI MDM: CPT

## 2022-12-19 PROCEDURE — 96374 THER/PROPH/DIAG INJ IV PUSH: CPT

## 2022-12-19 PROCEDURE — 71045 X-RAY EXAM CHEST 1 VIEW: CPT

## 2022-12-19 PROCEDURE — C9803 HOPD COVID-19 SPEC COLLECT: HCPCS

## 2022-12-19 PROCEDURE — 93010 ELECTROCARDIOGRAM REPORT: CPT | Performed by: INTERNAL MEDICINE

## 2022-12-19 RX ORDER — PAROXETINE 10 MG/5ML
SUSPENSION ORAL EVERY MORNING
COMMUNITY

## 2022-12-19 RX ORDER — PROPRANOLOL HYDROCHLORIDE 20 MG/1
20 TABLET ORAL 3 TIMES DAILY
COMMUNITY

## 2022-12-19 RX ORDER — ALBUTEROL SULFATE 90 UG/1
2 AEROSOL, METERED RESPIRATORY (INHALATION) EVERY 4 HOURS PRN
COMMUNITY

## 2022-12-19 RX ORDER — FENOFIBRATE 145 MG/1
145 TABLET, COATED ORAL DAILY
COMMUNITY

## 2022-12-19 RX ORDER — ATORVASTATIN CALCIUM 10 MG/1
10 TABLET, FILM COATED ORAL DAILY
COMMUNITY
End: 2022-12-19

## 2022-12-19 RX ORDER — DEXAMETHASONE SODIUM PHOSPHATE 10 MG/ML
10 INJECTION, SOLUTION INTRAMUSCULAR; INTRAVENOUS ONCE
Status: COMPLETED | OUTPATIENT
Start: 2022-12-19 | End: 2022-12-19

## 2022-12-19 RX ORDER — GLIPIZIDE 10 MG/1
10 TABLET ORAL
COMMUNITY

## 2022-12-19 RX ORDER — FAMOTIDINE 40 MG/1
40 TABLET, FILM COATED ORAL DAILY
COMMUNITY

## 2022-12-19 RX ORDER — CLOTRIMAZOLE 1 %
CREAM (GRAM) TOPICAL 2 TIMES DAILY
COMMUNITY

## 2022-12-19 RX ORDER — IPRATROPIUM BROMIDE AND ALBUTEROL SULFATE 2.5; .5 MG/3ML; MG/3ML
3 SOLUTION RESPIRATORY (INHALATION) ONCE
Status: COMPLETED | OUTPATIENT
Start: 2022-12-19 | End: 2022-12-19

## 2022-12-19 RX ORDER — AMLODIPINE BESYLATE 2.5 MG/1
2.5 TABLET ORAL DAILY
COMMUNITY

## 2022-12-19 RX ORDER — PIOGLITAZONEHYDROCHLORIDE 30 MG/1
30 TABLET ORAL DAILY
COMMUNITY

## 2022-12-19 RX ADMIN — DEXAMETHASONE SODIUM PHOSPHATE 10 MG: 10 INJECTION INTRAMUSCULAR; INTRAVENOUS at 02:03

## 2022-12-19 RX ADMIN — IPRATROPIUM BROMIDE AND ALBUTEROL SULFATE 3 ML: 2.5; .5 SOLUTION RESPIRATORY (INHALATION) at 02:15

## 2022-12-19 ASSESSMENT — PAIN SCALES - GENERAL: PAINLEVEL_OUTOF10: 0

## 2022-12-20 LAB
P AXIS (DEGREES): 37
PR-INTERVAL (MSEC): 163
QRS-INTERVAL (MSEC): 94
QT-INTERVAL (MSEC): 363
QTC: 382
REPORT TEXT: NORMAL
T AXIS (DEGREES): 38
VENTRICULAR RATE EKG/MIN (BPM): 69

## (undated) DEVICE — SOLUTION ENDOSCOPIC ANTI-FOG NON-TOXIC NON-ABRASIVE 6 CUBIC CENTIMETER WITH RADIOPAQUE ADHESIVE-BACKED SPONGE STERILE NOT MADE WITH NATURAL RUBBER LATEX MEDICHOICE: Brand: MEDICHOICE

## (undated) DEVICE — FILTERLINE NASAL ADULT O2/CO2

## (undated) DEVICE — GLOVE SURG SENSICARE SZ 7

## (undated) DEVICE — DRAIN INCS 24FR 5/16INX8MM

## (undated) DEVICE — DRAIN CHEST DRY ADULT/PED

## (undated) DEVICE — 1200CC GUARDIAN II: Brand: GUARDIAN

## (undated) DEVICE — GLOVE SURG TRIUMPH SZ 8

## (undated) DEVICE — ALCOHOL PREP,2 PLY, MEDIUM: Brand: WEBCOL

## (undated) DEVICE — BITE BLOCK ADULT 60F ELASTIC

## (undated) DEVICE — ENDOSCOPY PACK - LOWER: Brand: MEDLINE INDUSTRIES, INC.

## (undated) DEVICE — SYRINGE IV FLUSH PRE-FILL10M

## (undated) DEVICE — 3M™ RED DOT™ MONITORING ELECTRODE WITH FOAM TAPE AND STICKY GEL, 50/BAG, 20/CASE, 72/PLT 2570: Brand: RED DOT™

## (undated) DEVICE — 3M™ IOBAN™ 2 ANTIMICROBIAL INCISE DRAPE 6650EZ: Brand: IOBAN™ 2

## (undated) DEVICE — SPECIMEN CONTAINER,POSITIVE SEAL INDICATOR, OR PACKAGED: Brand: PRECISION

## (undated) DEVICE — BLADE ELECTRODE: Brand: EDGE

## (undated) DEVICE — SOL  .9 1000ML BTL

## (undated) DEVICE — SUTURE SILK 0 FSL

## (undated) DEVICE — MEDI-VAC NON-CONDUCTIVE SUCTION TUBING: Brand: CARDINAL HEALTH

## (undated) DEVICE — THE ECHELON, ECHELON ENDOPATH™ AND ECHELON FLEX™ FAMILIES OF ENDOSCOPIC LINEAR CUTTERS AND RELOADS ARE STERILE, SINGLE PATIENT USE INSTRUMENTS THAT SIMULTANEOUSLY CUT AND STAPLE TISSUE. THERE ARE SIX STAGGERED ROWS OF STAPLES, THREE ON EITHER SIDE OF THE CUT LINE. THE 45 MM INSTRUMENTS HAVE A STAPLE LINE THATIS APPROXIMATELY 45 MM LONG AND A CUT LINE THAT IS APPROXIMATELY 42 MM LONG. THE SHAFT CAN ROTATE FREELY IN BOTH DIRECTIONS AND AN ARTICULATION MECHANISM ON ARTICULATING INSTRUMENTS ENABLES BENDING THE DISTAL PORTIONOF THE SHAFT TO FACILITATE LATERAL ACCESS OF THE OPERATIVE SITE.THE INSTRUMENTS ARE SHIPPED WITHOUT A RELOAD AND MUST BE LOADED PRIOR TO USE. A STAPLE RETAINING CAP ON THE RELOAD PROTECTS THE STAPLE LEG POINTS DURING SHIPPING AND TRANSPORTATION. THE INSTRUMENTS’ LOCK-OUT FEATURE IS DESIGNED TO PREVENT A USED RELOAD FROM BEING REFIRED.: Brand: ECHELON ENDOPATH

## (undated) DEVICE — FORCEP BIOPSY RJ4 LG CAP W/ND

## (undated) DEVICE — ENCORE® PERRY STYLE 42 PF SIZE 6, STERILE LATEX POWDER-FREE SURGICAL GLOVE: Brand: ENCORE

## (undated) DEVICE — CV PACK-LF: Brand: MEDLINE INDUSTRIES, INC.

## (undated) DEVICE — NON-ADHERENT PAD PREPACK: Brand: TELFA

## (undated) DEVICE — ANCHOR TISSUE RETRIEVAL SYSTEM, BAG SIZE 1200 ML, PORT SIZE 15 MM: Brand: ANCHOR TISSUE RETRIEVAL SYSTEM

## (undated) DEVICE — CHLORAPREP 26ML APPLICATOR

## (undated) DEVICE — SKIN AFFIX .4ML

## (undated) DEVICE — STERILE POLYISOPRENE POWDER-FREE SURGICAL GLOVES: Brand: PROTEXIS

## (undated) DEVICE — CATH DRAIN 28F HYDRAGLIDE XL

## (undated) DEVICE — ENDOPATH ECHELON VASCULAR  RELOADS, WHITE, 35MM: Brand: ECHELON ENDOPATH

## (undated) DEVICE — Device

## (undated) DEVICE — SUTURE VICRYL 2-0 CT-1

## (undated) DEVICE — 3M™ STERI-STRIP™ REINFORCED ADHESIVE SKIN CLOSURES, R1547, 1/2 IN X 4 IN (12 MM X 100 MM), 6 STRIPS/ENVELOPE: Brand: 3M™ STERI-STRIP™

## (undated) DEVICE — Device: Brand: DEFENDO AIR/WATER/SUCTION AND BIOPSY VALVE

## (undated) DEVICE — SYRINGE 10ML LL TIP

## (undated) DEVICE — SUTURE MONOCRYL 4-0 PS-2

## (undated) DEVICE — WOUND RETRACTOR AND PROTECTOR: Brand: ALEXIS WOUND PROTECTOR-RETRACTOR

## (undated) DEVICE — ABSORBABLE HEMOSTAT (OXIDIZED REGENERATED CELLULOSE, U.S.P.): Brand: SURGICEL

## (undated) DEVICE — KENDALL SCD EXPRESS SLEEVES, KNEE LENGTH, MEDIUM: Brand: KENDALL SCD

## (undated) DEVICE — SYRINGE 30ML LL TIP

## (undated) DEVICE — GAUZE SPONGES,USP TYPE VII GAUZE, 12 PLY: Brand: CURITY

## (undated) DEVICE — ECHELON FLEX  POWERED VASCULAR STAPLER WITH ADVANCED PLACEMENT TIP, 35MM: Brand: ECHELON FLEX

## (undated) DEVICE — ARYGLE SUCTION CATHETER WITH CHIMNEY VALVE STRIAGHT PACKED 14 FR/ CH: Brand: ARGYLE

## (undated) DEVICE — SYRINGE 10ML SLIP TIP

## (undated) DEVICE — THE ECHELON FLEX POWERED PLUS ARTICULATING ENDOSCOPIC LINEAR CUTTERS ARE STERILE, SINGLE PATIENT USE INSTRUMENTS THAT SIMULTANEOUSLYCUT AND STAPLE TISSUE. THERE ARE SIX STAGGERED ROWS OF STAPLES, THREE ON EITHER SIDE OF THE CUT LINE. THE ECHELON FLEX 45 POWERED PLUSINSTRUMENTS HAVE A STAPLE LINE THAT IS APPROXIMATELY 45 MM LONG AND A CUT LINE THAT IS APPROXIMATELY 42 MM LONG. THE SHAFT CAN ROTATE FREELYIN BOTH DIRECTIONS AND AN ARTICULATION MECHANISM ENABLES THE DISTAL PORTION OF THE SHAFT TO PIVOT TO FACILITATE LATERAL ACCESS TO THE OPERATIVESITE.THE INSTRUMENTS ARE PACKAGED WITH A PRIMARY LITHIUM BATTERY PACK THAT MUST BE INSTALLED PRIOR TO USE. THERE ARE SPECIFIC REQUIREMENTS FORDISPOSING OF THE BATTERY PACK. REFER TO THE BATTERY PACK DISPOSAL SECTION.THE INSTRUMENTS ARE PACKAGED WITHOUT A RELOAD AND MUST BE LOADED PRIOR TO USE. A STAPLE RETAINING CAP ON THE RELOAD PROTECTS THE STAPLE LEGPOINTS DURING SHIPPING AND TRANSPORTATION. THE INSTRUMENTS’ LOCK-OUT FEATURE IS DESIGNED TO PREVENT A USED OR IMPROPERLY INSTALLED RELOADFROM BEING REFIRED OR AN INSTRUMENT FROM BEING FIRED WITHOUT A RELOAD.: Brand: ECHELON FLEX

## (undated) DEVICE — HEMOCLIP MED 24 CLIP/CARTRIDGE

## (undated) DEVICE — STANDARD HYPODERMIC NEEDLE,POLYPROPYLENE HUB: Brand: MONOJECT

## (undated) NOTE — LETTER
Flora Polk [address] [address]  [place], [address] [facility]    Consent for Operation  Date: __________________                                Time: _______________    1.  I authorize the performance upon Ramesh Hayes the following operation:    2. fle revealed by the pictures or by descriptive texts accompanying them. If the procedure has been videotaped, the surgeon will obtain the original videotape. The hospital will not be responsible for storage or maintenance of this tape.   7. For the purpose of a THAT MY DOCTOR PROVIDED ME WITH THE ABOVE EXPLANATIONS, THAT ALL BLANKS OR STATEMENTS REQUIRING INSERTION OR COMPLETION WERE FILLED IN.     Signature of Patient:   ___________________________    When the patient is a minor or mentally incompetent to give co iii. All of the medicines I take (including prescriptions, herbal supplements, and pills I can buy without a prescription (including street drugs/illegal medications).  Failure to inform my anesthesiologist about these medicines may increase my risk of anes _____________________________________________________________________________  Anesthesiologist Signature     Date   Time  I have discussed the procedure and information above with the patient (or patient’s representative) and answered their questions.  The

## (undated) NOTE — LETTER
[address] [facility] FOR BLOOD OR BLOOD COMPONENTS      In the course of your treatment, it may become necessary to administer a transfusion of blood or blood components.  This form provides basic information concerning this proc explain the alternatives to you if it has not already been done. I,Cira Hayes I, have read/had read to me the above. I understand the matters bearing on the decision whether or not to authorize a transfusion of blood or blood components.  I have no que

## (undated) NOTE — LETTER
Flora Polk [address] [facility] [address], [address] [place] [address]    Consent for Operation  Date: ______10/7/19____________                                Time: ________1545 pm_______    1.  I authorize the performance upon Omero Hayes the following operat procedure has been videotaped, the surgeon will obtain the original videotape. The hospital will not be responsible for storage or maintenance of this tape.   5. For the purpose of advancing medical education, I consent to the admittance of observers to the STATEMENTS REQUIRING INSERTION OR COMPLETION WERE FILLED IN.     Signature of Patient:   ___________________________    When the patient is a minor or mentally incompetent to give consent:  Signature of person authorized to consent for patient: ____________ supplements, and pills I can buy without a prescription (including street drugs/illegal medications). Failure to inform my anesthesiologist about these medicines may increase my risk of anesthetic complications. iv.  If I am allergic to anything or have ha Anesthesiologist Signature     Date   Time  I have discussed the procedure and information above with the patient (or patient’s representative) and answered their questions. The patient or their representative has agreed to have anesthesia services.     ___